# Patient Record
Sex: FEMALE | Race: WHITE | NOT HISPANIC OR LATINO | URBAN - METROPOLITAN AREA
[De-identification: names, ages, dates, MRNs, and addresses within clinical notes are randomized per-mention and may not be internally consistent; named-entity substitution may affect disease eponyms.]

---

## 2017-01-21 ENCOUNTER — OUTPATIENT (OUTPATIENT)
Dept: OUTPATIENT SERVICES | Facility: HOSPITAL | Age: 32
LOS: 1 days | End: 2017-01-21
Payer: MEDICAID

## 2017-01-21 DIAGNOSIS — Z98.89 OTHER SPECIFIED POSTPROCEDURAL STATES: Chronic | ICD-10-CM

## 2017-01-21 DIAGNOSIS — O26.90 PREGNANCY RELATED CONDITIONS, UNSPECIFIED, UNSPECIFIED TRIMESTER: ICD-10-CM

## 2017-01-21 PROCEDURE — 99213 OFFICE O/P EST LOW 20 MIN: CPT | Mod: 25

## 2017-01-21 PROCEDURE — 76815 OB US LIMITED FETUS(S): CPT | Mod: 26

## 2017-01-23 ENCOUNTER — OUTPATIENT (OUTPATIENT)
Dept: OUTPATIENT SERVICES | Facility: HOSPITAL | Age: 32
LOS: 1 days | End: 2017-01-23

## 2017-01-23 DIAGNOSIS — O26.90 PREGNANCY RELATED CONDITIONS, UNSPECIFIED, UNSPECIFIED TRIMESTER: ICD-10-CM

## 2017-01-23 DIAGNOSIS — Z98.89 OTHER SPECIFIED POSTPROCEDURAL STATES: Chronic | ICD-10-CM

## 2017-01-25 ENCOUNTER — INPATIENT (INPATIENT)
Facility: HOSPITAL | Age: 32
LOS: 1 days | Discharge: HOME | End: 2017-01-27
Attending: SPECIALIST | Admitting: SPECIALIST

## 2017-01-25 VITALS — WEIGHT: 169.76 LBS | HEIGHT: 64 IN

## 2017-01-25 DIAGNOSIS — O48.0 POST-TERM PREGNANCY: ICD-10-CM

## 2017-01-25 DIAGNOSIS — Z3A.00 WEEKS OF GESTATION OF PREGNANCY NOT SPECIFIED: ICD-10-CM

## 2017-01-25 DIAGNOSIS — O26.899 OTHER SPECIFIED PREGNANCY RELATED CONDITIONS, UNSPECIFIED TRIMESTER: ICD-10-CM

## 2017-01-25 DIAGNOSIS — Z98.89 OTHER SPECIFIED POSTPROCEDURAL STATES: Chronic | ICD-10-CM

## 2017-01-25 LAB
BASOPHILS # BLD AUTO: 0.03 K/UL — SIGNIFICANT CHANGE UP (ref 0–0.2)
BASOPHILS NFR BLD AUTO: 0.2 % — SIGNIFICANT CHANGE UP (ref 0–2)
BLD GP AB SCN SERPL QL: NEGATIVE — SIGNIFICANT CHANGE UP
EOSINOPHIL # BLD AUTO: 0.09 K/UL — SIGNIFICANT CHANGE UP (ref 0–0.5)
EOSINOPHIL NFR BLD AUTO: 0.7 % — SIGNIFICANT CHANGE UP (ref 0–6)
HCT VFR BLD CALC: 38.1 % — SIGNIFICANT CHANGE UP (ref 34.5–45)
HGB BLD-MCNC: 13.1 G/DL — SIGNIFICANT CHANGE UP (ref 11.5–15.5)
IMM GRANULOCYTES NFR BLD AUTO: 0.7 % — SIGNIFICANT CHANGE UP (ref 0–1.5)
LYMPHOCYTES # BLD AUTO: 18.3 % — SIGNIFICANT CHANGE UP (ref 13–44)
LYMPHOCYTES # BLD AUTO: 2.47 K/UL — SIGNIFICANT CHANGE UP (ref 1–3.3)
MCHC RBC-ENTMCNC: 30.2 PG — SIGNIFICANT CHANGE UP (ref 27–34)
MCHC RBC-ENTMCNC: 34.4 % — SIGNIFICANT CHANGE UP (ref 32–36)
MCV RBC AUTO: 87.8 FL — SIGNIFICANT CHANGE UP (ref 80–100)
MONOCYTES # BLD AUTO: 1.27 K/UL — HIGH (ref 0–0.9)
MONOCYTES NFR BLD AUTO: 9.4 % — SIGNIFICANT CHANGE UP (ref 2–14)
NEUTROPHILS # BLD AUTO: 9.55 K/UL — HIGH (ref 1.8–7.4)
NEUTROPHILS NFR BLD AUTO: 70.7 % — SIGNIFICANT CHANGE UP (ref 43–77)
PLATELET # BLD AUTO: 211 K/UL — SIGNIFICANT CHANGE UP (ref 150–400)
PMV BLD: 12 FL — SIGNIFICANT CHANGE UP (ref 7–13)
RBC # BLD: 4.34 M/UL — SIGNIFICANT CHANGE UP (ref 3.8–5.2)
RBC # FLD: 13.7 % — SIGNIFICANT CHANGE UP (ref 10.3–14.5)
RH IG SCN BLD-IMP: POSITIVE — SIGNIFICANT CHANGE UP
RH IG SCN BLD-IMP: POSITIVE — SIGNIFICANT CHANGE UP
T PALLIDUM AB TITR SER: NEGATIVE — SIGNIFICANT CHANGE UP
WBC # BLD: 13.5 K/UL — HIGH (ref 3.8–10.5)
WBC # FLD AUTO: 13.5 K/UL — HIGH (ref 3.8–10.5)

## 2017-01-25 RX ORDER — PENICILLIN G POTASSIUM 5000000 [IU]/1
5 POWDER, FOR SOLUTION INTRAMUSCULAR; INTRAPLEURAL; INTRATHECAL; INTRAVENOUS ONCE
Qty: 0 | Refills: 0 | Status: COMPLETED | OUTPATIENT
Start: 2017-01-25 | End: 2017-01-25

## 2017-01-25 RX ORDER — SIMETHICONE 80 MG/1
80 TABLET, CHEWABLE ORAL EVERY 6 HOURS
Qty: 0 | Refills: 0 | Status: DISCONTINUED | OUTPATIENT
Start: 2017-01-26 | End: 2017-01-27

## 2017-01-25 RX ORDER — TETANUS TOXOID, REDUCED DIPHTHERIA TOXOID AND ACELLULAR PERTUSSIS VACCINE, ADSORBED 5; 2.5; 8; 8; 2.5 [IU]/.5ML; [IU]/.5ML; UG/.5ML; UG/.5ML; UG/.5ML
0.5 SUSPENSION INTRAMUSCULAR ONCE
Qty: 0 | Refills: 0 | Status: DISCONTINUED | OUTPATIENT
Start: 2017-01-26 | End: 2017-01-27

## 2017-01-25 RX ORDER — OXYTOCIN 10 UNIT/ML
41.67 VIAL (ML) INJECTION
Qty: 20 | Refills: 0 | Status: DISCONTINUED | OUTPATIENT
Start: 2017-01-25 | End: 2017-01-26

## 2017-01-25 RX ORDER — ACETAMINOPHEN 500 MG
975 TABLET ORAL EVERY 6 HOURS
Qty: 0 | Refills: 0 | Status: DISCONTINUED | OUTPATIENT
Start: 2017-01-26 | End: 2017-01-27

## 2017-01-25 RX ORDER — PRAMOXINE HYDROCHLORIDE 150 MG/15G
1 AEROSOL, FOAM RECTAL EVERY 4 HOURS
Qty: 0 | Refills: 0 | Status: DISCONTINUED | OUTPATIENT
Start: 2017-01-25 | End: 2017-01-26

## 2017-01-25 RX ORDER — HYDROCORTISONE 1 %
1 OINTMENT (GRAM) TOPICAL EVERY 4 HOURS
Qty: 0 | Refills: 0 | Status: DISCONTINUED | OUTPATIENT
Start: 2017-01-26 | End: 2017-01-27

## 2017-01-25 RX ORDER — AER TRAVELER 0.5 G/1
1 SOLUTION RECTAL; TOPICAL EVERY 4 HOURS
Qty: 0 | Refills: 0 | Status: DISCONTINUED | OUTPATIENT
Start: 2017-01-26 | End: 2017-01-27

## 2017-01-25 RX ORDER — MAGNESIUM HYDROXIDE 400 MG/1
30 TABLET, CHEWABLE ORAL
Qty: 0 | Refills: 0 | Status: DISCONTINUED | OUTPATIENT
Start: 2017-01-26 | End: 2017-01-27

## 2017-01-25 RX ORDER — DOCUSATE SODIUM 100 MG
100 CAPSULE ORAL
Qty: 0 | Refills: 0 | Status: DISCONTINUED | OUTPATIENT
Start: 2017-01-26 | End: 2017-01-27

## 2017-01-25 RX ORDER — OXYCODONE HYDROCHLORIDE 5 MG/1
5 TABLET ORAL
Qty: 0 | Refills: 0 | Status: DISCONTINUED | OUTPATIENT
Start: 2017-01-26 | End: 2017-01-27

## 2017-01-25 RX ORDER — PENICILLIN G POTASSIUM 5000000 [IU]/1
POWDER, FOR SOLUTION INTRAMUSCULAR; INTRAPLEURAL; INTRATHECAL; INTRAVENOUS
Qty: 0 | Refills: 0 | Status: DISCONTINUED | OUTPATIENT
Start: 2017-01-25 | End: 2017-01-25

## 2017-01-25 RX ORDER — SODIUM CHLORIDE 9 MG/ML
1000 INJECTION, SOLUTION INTRAVENOUS
Qty: 0 | Refills: 0 | Status: DISCONTINUED | OUTPATIENT
Start: 2017-01-25 | End: 2017-01-25

## 2017-01-25 RX ORDER — OXYCODONE HYDROCHLORIDE 5 MG/1
5 TABLET ORAL EVERY 4 HOURS
Qty: 0 | Refills: 0 | Status: DISCONTINUED | OUTPATIENT
Start: 2017-01-26 | End: 2017-01-27

## 2017-01-25 RX ORDER — IBUPROFEN 200 MG
600 TABLET ORAL EVERY 6 HOURS
Qty: 0 | Refills: 0 | Status: DISCONTINUED | OUTPATIENT
Start: 2017-01-26 | End: 2017-01-27

## 2017-01-25 RX ORDER — AER TRAVELER 0.5 G/1
1 SOLUTION RECTAL; TOPICAL EVERY 4 HOURS
Qty: 0 | Refills: 0 | Status: DISCONTINUED | OUTPATIENT
Start: 2017-01-25 | End: 2017-01-26

## 2017-01-25 RX ORDER — DIBUCAINE 1 %
1 OINTMENT (GRAM) RECTAL EVERY 4 HOURS
Qty: 0 | Refills: 0 | Status: DISCONTINUED | OUTPATIENT
Start: 2017-01-25 | End: 2017-01-26

## 2017-01-25 RX ORDER — OXYTOCIN 10 UNIT/ML
333.33 VIAL (ML) INJECTION
Qty: 20 | Refills: 0 | Status: DISCONTINUED | OUTPATIENT
Start: 2017-01-25 | End: 2017-01-25

## 2017-01-25 RX ORDER — CITRIC ACID/SODIUM CITRATE 300-500 MG
15 SOLUTION, ORAL ORAL EVERY 4 HOURS
Qty: 0 | Refills: 0 | Status: DISCONTINUED | OUTPATIENT
Start: 2017-01-25 | End: 2017-01-25

## 2017-01-25 RX ORDER — LANOLIN
1 OINTMENT (GRAM) TOPICAL EVERY 6 HOURS
Qty: 0 | Refills: 0 | Status: DISCONTINUED | OUTPATIENT
Start: 2017-01-26 | End: 2017-01-27

## 2017-01-25 RX ORDER — DIPHENHYDRAMINE HCL 50 MG
25 CAPSULE ORAL EVERY 6 HOURS
Qty: 0 | Refills: 0 | Status: DISCONTINUED | OUTPATIENT
Start: 2017-01-26 | End: 2017-01-27

## 2017-01-25 RX ORDER — DIBUCAINE 1 %
1 OINTMENT (GRAM) RECTAL EVERY 4 HOURS
Qty: 0 | Refills: 0 | Status: DISCONTINUED | OUTPATIENT
Start: 2017-01-26 | End: 2017-01-27

## 2017-01-25 RX ORDER — PRAMOXINE HYDROCHLORIDE 150 MG/15G
1 AEROSOL, FOAM RECTAL EVERY 4 HOURS
Qty: 0 | Refills: 0 | Status: DISCONTINUED | OUTPATIENT
Start: 2017-01-26 | End: 2017-01-27

## 2017-01-25 RX ORDER — KETOROLAC TROMETHAMINE 30 MG/ML
30 SYRINGE (ML) INJECTION ONCE
Qty: 0 | Refills: 0 | Status: DISCONTINUED | OUTPATIENT
Start: 2017-01-25 | End: 2017-01-25

## 2017-01-25 RX ORDER — GLYCERIN ADULT
1 SUPPOSITORY, RECTAL RECTAL AT BEDTIME
Qty: 0 | Refills: 0 | Status: DISCONTINUED | OUTPATIENT
Start: 2017-01-26 | End: 2017-01-27

## 2017-01-25 RX ORDER — SODIUM CHLORIDE 9 MG/ML
3 INJECTION INTRAMUSCULAR; INTRAVENOUS; SUBCUTANEOUS EVERY 8 HOURS
Qty: 0 | Refills: 0 | Status: DISCONTINUED | OUTPATIENT
Start: 2017-01-25 | End: 2017-01-26

## 2017-01-25 RX ORDER — SODIUM CHLORIDE 9 MG/ML
3 INJECTION INTRAMUSCULAR; INTRAVENOUS; SUBCUTANEOUS EVERY 8 HOURS
Qty: 0 | Refills: 0 | Status: DISCONTINUED | OUTPATIENT
Start: 2017-01-26 | End: 2017-01-27

## 2017-01-25 RX ORDER — HYDROCORTISONE 1 %
1 OINTMENT (GRAM) TOPICAL EVERY 4 HOURS
Qty: 0 | Refills: 0 | Status: DISCONTINUED | OUTPATIENT
Start: 2017-01-25 | End: 2017-01-26

## 2017-01-25 RX ORDER — PENICILLIN G POTASSIUM 5000000 [IU]/1
2.5 POWDER, FOR SOLUTION INTRAMUSCULAR; INTRAPLEURAL; INTRATHECAL; INTRAVENOUS EVERY 4 HOURS
Qty: 0 | Refills: 0 | Status: DISCONTINUED | OUTPATIENT
Start: 2017-01-25 | End: 2017-01-25

## 2017-01-25 RX ORDER — SODIUM CHLORIDE 9 MG/ML
1000 INJECTION, SOLUTION INTRAVENOUS ONCE
Qty: 0 | Refills: 0 | Status: DISCONTINUED | OUTPATIENT
Start: 2017-01-25 | End: 2017-01-25

## 2017-01-25 RX ADMIN — SODIUM CHLORIDE 3 MILLILITER(S): 9 INJECTION INTRAMUSCULAR; INTRAVENOUS; SUBCUTANEOUS at 22:51

## 2017-01-25 RX ADMIN — Medication 30 MILLIGRAM(S): at 22:49

## 2017-01-25 RX ADMIN — PENICILLIN G POTASSIUM 200 MILLION UNIT(S): 5000000 POWDER, FOR SOLUTION INTRAMUSCULAR; INTRAPLEURAL; INTRATHECAL; INTRAVENOUS at 16:33

## 2017-01-25 RX ADMIN — SODIUM CHLORIDE 125 MILLILITER(S): 9 INJECTION, SOLUTION INTRAVENOUS at 16:33

## 2017-01-25 RX ADMIN — Medication 125 MILLIUNIT(S)/MIN: at 22:50

## 2017-01-25 NOTE — DISCHARGE NOTE OB - CARE PROVIDER_API CALL
Jasmyne Suarez (MD), Obstetrics and Gynecology  [address] [address] [place] [id number]  Phone: [phone]  Fax: [phone]

## 2017-01-25 NOTE — DISCHARGE NOTE OB - MEDICATION SUMMARY - MEDICATIONS TO STOP TAKING
I will STOP taking the medications listed below when I get home from the hospital:    ibuprofen 600 mg oral tablet  -- 1 tab(s) by mouth every 6 hours

## 2017-01-25 NOTE — DISCHARGE NOTE OB - CARE PLAN
Principal Discharge DX:	Delivery normal  Goal:	home with self care  Instructions for follow-up, activity and diet:	home with self care

## 2017-01-25 NOTE — DISCHARGE NOTE OB - MATERIALS PROVIDED
Letter of Medical Neccessity/[place]  Immunization Record/[facility] Hearing Screen Program/Breastfeeding Log/Vaccinations/Birth Certificate Instructions/Guide to Postpartum Care

## 2017-01-25 NOTE — DISCHARGE NOTE OB - PATIENT PORTAL LINK FT
“You can access the FollowHealth Patient Portal, offered by [facility], by registering with the following website: [email or web address]”

## 2017-01-25 NOTE — DISCHARGE NOTE OB - MEDICATION SUMMARY - MEDICATIONS TO TAKE
I will START or STAY ON the medications listed below when I get home from the hospital:    ibuprofen 600 mg oral tablet  -- 1 tab(s) by mouth every 6 hours  -- Indication: For Pain    Prenatal Multivitamins with Folic Acid 1 mg oral capsule  -- 1 cap(s) by mouth once a day  -- Indication: For nutrition I will START or STAY ON the medications listed below when I get home from the hospital:    ibuprofen 600 mg oral tablet  -- 1 tab(s) by mouth every 6 hours, As Needed  -- Indication: For Delivery normal    Prenatal Multivitamins with Folic Acid 1 mg oral capsule  -- 1 cap(s) by mouth once a day  -- Indication: For Delivery normal

## 2017-01-26 RX ORDER — OXYTOCIN 10 UNIT/ML
41.67 VIAL (ML) INJECTION
Qty: 20 | Refills: 0 | Status: DISCONTINUED | OUTPATIENT
Start: 2017-01-26 | End: 2017-01-26

## 2017-01-26 RX ADMIN — Medication 975 MILLIGRAM(S): at 15:08

## 2017-01-26 RX ADMIN — Medication 600 MILLIGRAM(S): at 13:45

## 2017-01-26 RX ADMIN — OXYCODONE HYDROCHLORIDE 5 MILLIGRAM(S): 5 TABLET ORAL at 11:15

## 2017-01-26 RX ADMIN — Medication 975 MILLIGRAM(S): at 09:43

## 2017-01-26 RX ADMIN — OXYCODONE HYDROCHLORIDE 5 MILLIGRAM(S): 5 TABLET ORAL at 01:42

## 2017-01-26 RX ADMIN — Medication 600 MILLIGRAM(S): at 18:02

## 2017-01-26 RX ADMIN — OXYCODONE HYDROCHLORIDE 5 MILLIGRAM(S): 5 TABLET ORAL at 10:41

## 2017-01-26 RX ADMIN — OXYCODONE HYDROCHLORIDE 5 MILLIGRAM(S): 5 TABLET ORAL at 05:26

## 2017-01-26 RX ADMIN — Medication 975 MILLIGRAM(S): at 10:15

## 2017-01-26 RX ADMIN — OXYCODONE HYDROCHLORIDE 5 MILLIGRAM(S): 5 TABLET ORAL at 18:41

## 2017-01-26 RX ADMIN — OXYCODONE HYDROCHLORIDE 5 MILLIGRAM(S): 5 TABLET ORAL at 06:26

## 2017-01-26 RX ADMIN — Medication 600 MILLIGRAM(S): at 06:26

## 2017-01-26 RX ADMIN — Medication 600 MILLIGRAM(S): at 18:40

## 2017-01-26 RX ADMIN — SODIUM CHLORIDE 3 MILLILITER(S): 9 INJECTION INTRAMUSCULAR; INTRAVENOUS; SUBCUTANEOUS at 06:55

## 2017-01-26 RX ADMIN — Medication 975 MILLIGRAM(S): at 01:42

## 2017-01-26 RX ADMIN — Medication 600 MILLIGRAM(S): at 13:07

## 2017-01-26 RX ADMIN — OXYCODONE HYDROCHLORIDE 5 MILLIGRAM(S): 5 TABLET ORAL at 20:48

## 2017-01-26 RX ADMIN — OXYCODONE HYDROCHLORIDE 5 MILLIGRAM(S): 5 TABLET ORAL at 00:42

## 2017-01-26 RX ADMIN — Medication 975 MILLIGRAM(S): at 00:42

## 2017-01-26 RX ADMIN — Medication 600 MILLIGRAM(S): at 05:26

## 2017-01-26 RX ADMIN — OXYCODONE HYDROCHLORIDE 5 MILLIGRAM(S): 5 TABLET ORAL at 15:50

## 2017-01-26 RX ADMIN — OXYCODONE HYDROCHLORIDE 5 MILLIGRAM(S): 5 TABLET ORAL at 21:18

## 2017-01-26 RX ADMIN — Medication 975 MILLIGRAM(S): at 15:50

## 2017-01-26 RX ADMIN — OXYCODONE HYDROCHLORIDE 5 MILLIGRAM(S): 5 TABLET ORAL at 18:05

## 2017-01-26 RX ADMIN — OXYCODONE HYDROCHLORIDE 5 MILLIGRAM(S): 5 TABLET ORAL at 15:08

## 2017-01-27 VITALS
SYSTOLIC BLOOD PRESSURE: 108 MMHG | TEMPERATURE: 98 F | OXYGEN SATURATION: 98 % | HEART RATE: 61 BPM | DIASTOLIC BLOOD PRESSURE: 67 MMHG | RESPIRATION RATE: 18 BRPM

## 2017-01-27 RX ORDER — IBUPROFEN 200 MG
1 TABLET ORAL
Qty: 0 | Refills: 0 | COMMUNITY

## 2017-01-27 RX ADMIN — OXYCODONE HYDROCHLORIDE 5 MILLIGRAM(S): 5 TABLET ORAL at 00:42

## 2017-01-27 RX ADMIN — OXYCODONE HYDROCHLORIDE 5 MILLIGRAM(S): 5 TABLET ORAL at 05:13

## 2017-01-27 RX ADMIN — Medication 975 MILLIGRAM(S): at 05:13

## 2017-01-27 RX ADMIN — Medication 600 MILLIGRAM(S): at 01:14

## 2017-01-27 RX ADMIN — Medication 600 MILLIGRAM(S): at 07:26

## 2017-01-27 RX ADMIN — Medication 975 MILLIGRAM(S): at 04:43

## 2017-01-27 RX ADMIN — Medication 600 MILLIGRAM(S): at 06:56

## 2017-01-27 RX ADMIN — OXYCODONE HYDROCHLORIDE 5 MILLIGRAM(S): 5 TABLET ORAL at 04:43

## 2017-01-27 RX ADMIN — Medication 600 MILLIGRAM(S): at 00:44

## 2017-01-27 RX ADMIN — OXYCODONE HYDROCHLORIDE 5 MILLIGRAM(S): 5 TABLET ORAL at 01:14

## 2017-06-07 ENCOUNTER — RESULT REVIEW (OUTPATIENT)
Age: 32
End: 2017-06-07

## 2018-10-22 ENCOUNTER — RESULT REVIEW (OUTPATIENT)
Age: 33
End: 2018-10-22

## 2019-01-30 ENCOUNTER — ASOB RESULT (OUTPATIENT)
Age: 34
End: 2019-01-30

## 2019-01-30 ENCOUNTER — APPOINTMENT (OUTPATIENT)
Dept: ANTEPARTUM | Facility: CLINIC | Age: 34
End: 2019-01-30
Payer: MEDICAID

## 2019-01-30 PROCEDURE — 76811 OB US DETAILED SNGL FETUS: CPT

## 2019-04-30 ENCOUNTER — OUTPATIENT (OUTPATIENT)
Dept: INPATIENT UNIT | Facility: HOSPITAL | Age: 34
LOS: 1 days | Discharge: HOME | End: 2019-04-30

## 2019-04-30 VITALS — DIASTOLIC BLOOD PRESSURE: 59 MMHG | SYSTOLIC BLOOD PRESSURE: 104 MMHG | HEART RATE: 94 BPM

## 2019-04-30 VITALS
DIASTOLIC BLOOD PRESSURE: 65 MMHG | OXYGEN SATURATION: 100 % | TEMPERATURE: 99 F | HEART RATE: 103 BPM | RESPIRATION RATE: 16 BRPM | SYSTOLIC BLOOD PRESSURE: 116 MMHG

## 2019-04-30 DIAGNOSIS — O26.899 OTHER SPECIFIED PREGNANCY RELATED CONDITIONS, UNSPECIFIED TRIMESTER: ICD-10-CM

## 2019-04-30 DIAGNOSIS — Z3A.00 WEEKS OF GESTATION OF PREGNANCY NOT SPECIFIED: ICD-10-CM

## 2019-04-30 DIAGNOSIS — Z98.89 OTHER SPECIFIED POSTPROCEDURAL STATES: Chronic | ICD-10-CM

## 2019-04-30 LAB
ALBUMIN SERPL ELPH-MCNC: 3.1 G/DL — LOW (ref 3.3–5)
ALP SERPL-CCNC: 105 U/L — SIGNIFICANT CHANGE UP (ref 40–120)
ALT FLD-CCNC: 15 U/L — SIGNIFICANT CHANGE UP (ref 4–33)
ANION GAP SERPL CALC-SCNC: 13 MMO/L — SIGNIFICANT CHANGE UP (ref 7–14)
APPEARANCE UR: CLEAR — SIGNIFICANT CHANGE UP
APTT BLD: 26.3 SEC — LOW (ref 27.5–36.3)
AST SERPL-CCNC: 13 U/L — SIGNIFICANT CHANGE UP (ref 4–32)
BASOPHILS # BLD AUTO: 0.06 K/UL — SIGNIFICANT CHANGE UP (ref 0–0.2)
BASOPHILS NFR BLD AUTO: 0.6 % — SIGNIFICANT CHANGE UP (ref 0–2)
BILIRUB SERPL-MCNC: < 0.2 MG/DL — LOW (ref 0.2–1.2)
BILIRUB UR-MCNC: NEGATIVE — SIGNIFICANT CHANGE UP
BLOOD UR QL VISUAL: NEGATIVE — SIGNIFICANT CHANGE UP
BUN SERPL-MCNC: 9 MG/DL — SIGNIFICANT CHANGE UP (ref 7–23)
CALCIUM SERPL-MCNC: 8.9 MG/DL — SIGNIFICANT CHANGE UP (ref 8.4–10.5)
CHLORIDE SERPL-SCNC: 104 MMOL/L — SIGNIFICANT CHANGE UP (ref 98–107)
CO2 SERPL-SCNC: 21 MMOL/L — LOW (ref 22–31)
COLOR SPEC: COLORLESS — SIGNIFICANT CHANGE UP
CREAT ?TM UR-MCNC: 28.3 MG/DL — SIGNIFICANT CHANGE UP
CREAT SERPL-MCNC: 0.45 MG/DL — LOW (ref 0.5–1.3)
EOSINOPHIL # BLD AUTO: 0.18 K/UL — SIGNIFICANT CHANGE UP (ref 0–0.5)
EOSINOPHIL NFR BLD AUTO: 1.7 % — SIGNIFICANT CHANGE UP (ref 0–6)
FIBRINOGEN PPP-MCNC: 677.2 MG/DL — HIGH (ref 350–510)
GLUCOSE SERPL-MCNC: 84 MG/DL — SIGNIFICANT CHANGE UP (ref 70–99)
GLUCOSE UR-MCNC: NEGATIVE — SIGNIFICANT CHANGE UP
HCT VFR BLD CALC: 35 % — SIGNIFICANT CHANGE UP (ref 34.5–45)
HGB BLD-MCNC: 11.9 G/DL — SIGNIFICANT CHANGE UP (ref 11.5–15.5)
IMM GRANULOCYTES NFR BLD AUTO: 1.3 % — SIGNIFICANT CHANGE UP (ref 0–1.5)
INR BLD: 0.97 — SIGNIFICANT CHANGE UP (ref 0.88–1.17)
KETONES UR-MCNC: NEGATIVE — SIGNIFICANT CHANGE UP
LDH SERPL L TO P-CCNC: 171 U/L — SIGNIFICANT CHANGE UP (ref 135–225)
LEUKOCYTE ESTERASE UR-ACNC: NEGATIVE — SIGNIFICANT CHANGE UP
LYMPHOCYTES # BLD AUTO: 2.29 K/UL — SIGNIFICANT CHANGE UP (ref 1–3.3)
LYMPHOCYTES # BLD AUTO: 21.5 % — SIGNIFICANT CHANGE UP (ref 13–44)
MCHC RBC-ENTMCNC: 29.8 PG — SIGNIFICANT CHANGE UP (ref 27–34)
MCHC RBC-ENTMCNC: 34 % — SIGNIFICANT CHANGE UP (ref 32–36)
MCV RBC AUTO: 87.5 FL — SIGNIFICANT CHANGE UP (ref 80–100)
MONOCYTES # BLD AUTO: 1.14 K/UL — HIGH (ref 0–0.9)
MONOCYTES NFR BLD AUTO: 10.7 % — SIGNIFICANT CHANGE UP (ref 2–14)
NEUTROPHILS # BLD AUTO: 6.84 K/UL — SIGNIFICANT CHANGE UP (ref 1.8–7.4)
NEUTROPHILS NFR BLD AUTO: 64.2 % — SIGNIFICANT CHANGE UP (ref 43–77)
NITRITE UR-MCNC: NEGATIVE — SIGNIFICANT CHANGE UP
NRBC # FLD: 0 K/UL — SIGNIFICANT CHANGE UP (ref 0–0)
PH UR: 8 — SIGNIFICANT CHANGE UP (ref 5–8)
PLATELET # BLD AUTO: 167 K/UL — SIGNIFICANT CHANGE UP (ref 150–400)
PMV BLD: 11.4 FL — SIGNIFICANT CHANGE UP (ref 7–13)
POTASSIUM SERPL-MCNC: 3.6 MMOL/L — SIGNIFICANT CHANGE UP (ref 3.5–5.3)
POTASSIUM SERPL-SCNC: 3.6 MMOL/L — SIGNIFICANT CHANGE UP (ref 3.5–5.3)
PROT SERPL-MCNC: 6.1 G/DL — SIGNIFICANT CHANGE UP (ref 6–8.3)
PROT UR-MCNC: 5.7 MG/DL — SIGNIFICANT CHANGE UP
PROT UR-MCNC: NEGATIVE — SIGNIFICANT CHANGE UP
PROTHROM AB SERPL-ACNC: 10.7 SEC — SIGNIFICANT CHANGE UP (ref 9.8–13.1)
RBC # BLD: 4 M/UL — SIGNIFICANT CHANGE UP (ref 3.8–5.2)
RBC # FLD: 13.7 % — SIGNIFICANT CHANGE UP (ref 10.3–14.5)
SODIUM SERPL-SCNC: 138 MMOL/L — SIGNIFICANT CHANGE UP (ref 135–145)
SP GR SPEC: 1.01 — SIGNIFICANT CHANGE UP (ref 1–1.04)
URATE SERPL-MCNC: 3 MG/DL — SIGNIFICANT CHANGE UP (ref 2.5–7)
UROBILINOGEN FLD QL: NORMAL — SIGNIFICANT CHANGE UP
WBC # BLD: 10.65 K/UL — HIGH (ref 3.8–10.5)
WBC # FLD AUTO: 10.65 K/UL — HIGH (ref 3.8–10.5)

## 2019-04-30 RX ORDER — IBUPROFEN 200 MG
1 TABLET ORAL
Qty: 0 | Refills: 0 | COMMUNITY

## 2019-04-30 NOTE — OB PROVIDER TRIAGE NOTE - NSOBPROVIDERNOTE_OBGYN_ALL_OB_FT
Patient is a 33y/o  @ 33 1/7wks gestation who reports to triage, from the office, to r/o PEC.  SB/P in the office were 140 - 160,  DB/P 90'S  Denies h/a, n/v, visual disturbances or ruq/epigastric pain.  Denies lof/vb/ctx.  Reports good fetal movements.    Denies AP Complications  Meds - pnv  NKDA    OB   @ 37wks IOL 2' Oligo - 2007 - 4lbs    - 9/15/2008 - 6lbds 13oz   - 2010 - 6lbs 7oz   - 2014 - 6lbs 13oz   - 2017 - 6lbs 10oz  Incomp ab x2 with D&C X 2  Comp SAB x1     B/P range -   Adbdomen gravid, soft and nontender  NST -  Cephalic presentation  PEC Labs sent. Patient is a 33y/o  @ 33 1/7wks gestation who reports to triage, from the office, to r/o PEC.  SB/P in the office were 140 - 160,  DB/P 90'S  Denies h/a, n/v, visual disturbances or ruq/epigastric pain.  Denies lof/vb/ctx.  Reports good fetal movements.    Denies AP Complications  Meds - pnv  NKDA    OB   @ 37wks IOL 2' Oligo - 2007 - 4lbs    - 9/15/2008 - 6lbds 13oz   - 2010 - 6lbs 7oz   - 2014 - 6lbs 13oz   - 2017 - 6lbs 10oz  Incomp ab x2 with D&C X 2  Comp SAB x1     B/P range - /50-68  Adbdomen gravid, soft and nontender  NST - cat 1 fht  Cephalic presentation  PEC Labs - wnl    Discussed patient with Dr Schwarz.  Patient is cleared for discharge home.  To f/u with Dr Nava as scheduled

## 2019-04-30 NOTE — OB PROVIDER TRIAGE NOTE - NSHPLABSRESULTS_GEN_ALL_CORE
pec labs pec labs                          11.9   10.65 )-----------( 167      ( 2019 20:50 )             35.0         138  |  104  |  9   ----------------------------<  84  3.6   |  21<L>  |  0.45<L>    Ca    8.9      2019 20:50    TPro  6.1  /  Alb  3.1<L>  /  TBili  < 0.2<L>  /  DBili  x   /  AST  13  /  ALT  15  /  AlkPhos  105            Urinalysis Basic - ( 2019 20:50 )    Color: COLORLESS / Appearance: CLEAR / S.010 / pH: 8.0  Gluc: NEGATIVE / Ketone: NEGATIVE  / Bili: NEGATIVE / Urobili: NORMAL   Blood: NEGATIVE / Protein: NEGATIVE / Nitrite: NEGATIVE   Leuk Esterase: NEGATIVE / RBC: x / WBC x   Sq Epi: x / Non Sq Epi: x / Bacteria: x      PT/INR - ( 2019 20:50 )   PT: 10.7 SEC;   INR: 0.97          PTT - ( 2019 20:50 )  PTT:26.3 SEC    CAPILLARY BLOOD GLUCOSE        Urine culture     Rapid flu   creatinine    protein Protein, Random Urine: 5.7 mg/dL (19 @ 20:50)    P/c ratio     Fibrinogen Assay Fibrinogen Assay: 677.2 mg/dL (19 @ 20:50)

## 2019-04-30 NOTE — OB PROVIDER TRIAGE NOTE - NSHPPHYSICALEXAM_GEN_ALL_CORE
Adbdomen gravid, soft and nontender  NST -  Cephalic presentation Adbdomen gravid, soft and nontender  NST - cat 1 fht  Cephalic presentation

## 2019-04-30 NOTE — OB PROVIDER TRIAGE NOTE - NS_OBGYNHISTORY_OBGYN_ALL_OB_FT
@ 37wks IOL 2' Oligo - 2007 - 4lbs    - 9/15/2008 - 6lbds 13oz   - 2010 - 6lbs 7oz   - 2014 - 6lbs 13oz   - 2017 - 6lbs 10oz  Incomp ab x2 with D&C X 2  Comp SAB x1

## 2019-04-30 NOTE — OB PROVIDER TRIAGE NOTE - HISTORY OF PRESENT ILLNESS
Patient is a 33y/o  @ 33 1/7wks gestation who reports to triage, from the office, to r/o PEC.  SB/P in the office were 140 - 160,  DB/P 90'S  Denies h/a, n/v, visual disturbances or ruq/epigastric pain.  Denies lof/vb/ctx.  Reports good fetal movements.

## 2019-06-23 ENCOUNTER — INPATIENT (INPATIENT)
Facility: HOSPITAL | Age: 34
LOS: 1 days | Discharge: HOME | End: 2019-06-25
Attending: SPECIALIST | Admitting: SPECIALIST

## 2019-06-23 ENCOUNTER — TRANSCRIPTION ENCOUNTER (OUTPATIENT)
Age: 34
End: 2019-06-23

## 2019-06-23 VITALS
SYSTOLIC BLOOD PRESSURE: 127 MMHG | TEMPERATURE: 99 F | DIASTOLIC BLOOD PRESSURE: 78 MMHG | RESPIRATION RATE: 16 BRPM | HEART RATE: 88 BPM

## 2019-06-23 DIAGNOSIS — O26.899 OTHER SPECIFIED PREGNANCY RELATED CONDITIONS, UNSPECIFIED TRIMESTER: ICD-10-CM

## 2019-06-23 DIAGNOSIS — Z98.89 OTHER SPECIFIED POSTPROCEDURAL STATES: Chronic | ICD-10-CM

## 2019-06-23 DIAGNOSIS — Z3A.00 WEEKS OF GESTATION OF PREGNANCY NOT SPECIFIED: ICD-10-CM

## 2019-06-23 LAB
BASOPHILS # BLD AUTO: 0.05 K/UL — SIGNIFICANT CHANGE UP (ref 0–0.2)
BASOPHILS NFR BLD AUTO: 0.4 % — SIGNIFICANT CHANGE UP (ref 0–2)
BLD GP AB SCN SERPL QL: NEGATIVE — SIGNIFICANT CHANGE UP
EOSINOPHIL # BLD AUTO: 0.06 K/UL — SIGNIFICANT CHANGE UP (ref 0–0.5)
EOSINOPHIL NFR BLD AUTO: 0.5 % — SIGNIFICANT CHANGE UP (ref 0–6)
HCT VFR BLD CALC: 38.4 % — SIGNIFICANT CHANGE UP (ref 34.5–45)
HGB BLD-MCNC: 13.1 G/DL — SIGNIFICANT CHANGE UP (ref 11.5–15.5)
IMM GRANULOCYTES NFR BLD AUTO: 0.7 % — SIGNIFICANT CHANGE UP (ref 0–1.5)
LYMPHOCYTES # BLD AUTO: 18.3 % — SIGNIFICANT CHANGE UP (ref 13–44)
LYMPHOCYTES # BLD AUTO: 2.19 K/UL — SIGNIFICANT CHANGE UP (ref 1–3.3)
MCHC RBC-ENTMCNC: 30.3 PG — SIGNIFICANT CHANGE UP (ref 27–34)
MCHC RBC-ENTMCNC: 34.1 % — SIGNIFICANT CHANGE UP (ref 32–36)
MCV RBC AUTO: 88.7 FL — SIGNIFICANT CHANGE UP (ref 80–100)
MONOCYTES # BLD AUTO: 0.87 K/UL — SIGNIFICANT CHANGE UP (ref 0–0.9)
MONOCYTES NFR BLD AUTO: 7.3 % — SIGNIFICANT CHANGE UP (ref 2–14)
NEUTROPHILS # BLD AUTO: 8.75 K/UL — HIGH (ref 1.8–7.4)
NEUTROPHILS NFR BLD AUTO: 72.8 % — SIGNIFICANT CHANGE UP (ref 43–77)
NRBC # FLD: 0 K/UL — SIGNIFICANT CHANGE UP (ref 0–0)
PLATELET # BLD AUTO: 213 K/UL — SIGNIFICANT CHANGE UP (ref 150–400)
PMV BLD: 12.3 FL — SIGNIFICANT CHANGE UP (ref 7–13)
RBC # BLD: 4.33 M/UL — SIGNIFICANT CHANGE UP (ref 3.8–5.2)
RBC # FLD: 14 % — SIGNIFICANT CHANGE UP (ref 10.3–14.5)
RH IG SCN BLD-IMP: POSITIVE — SIGNIFICANT CHANGE UP
WBC # BLD: 12 K/UL — HIGH (ref 3.8–10.5)
WBC # FLD AUTO: 12 K/UL — HIGH (ref 3.8–10.5)

## 2019-06-23 RX ORDER — IBUPROFEN 200 MG
600 TABLET ORAL EVERY 6 HOURS
Refills: 0 | Status: COMPLETED | OUTPATIENT
Start: 2019-06-23 | End: 2020-05-21

## 2019-06-23 RX ORDER — PRAMOXINE HYDROCHLORIDE 150 MG/15G
1 AEROSOL, FOAM RECTAL EVERY 4 HOURS
Refills: 0 | Status: DISCONTINUED | OUTPATIENT
Start: 2019-06-23 | End: 2019-06-25

## 2019-06-23 RX ORDER — OXYCODONE HYDROCHLORIDE 5 MG/1
5 TABLET ORAL ONCE
Refills: 0 | Status: DISCONTINUED | OUTPATIENT
Start: 2019-06-23 | End: 2019-06-25

## 2019-06-23 RX ORDER — BENZOCAINE 10 %
1 GEL (GRAM) MUCOUS MEMBRANE EVERY 6 HOURS
Refills: 0 | Status: DISCONTINUED | OUTPATIENT
Start: 2019-06-23 | End: 2019-06-25

## 2019-06-23 RX ORDER — DOCUSATE SODIUM 100 MG
100 CAPSULE ORAL
Refills: 0 | Status: DISCONTINUED | OUTPATIENT
Start: 2019-06-23 | End: 2019-06-25

## 2019-06-23 RX ORDER — DIBUCAINE 1 %
1 OINTMENT (GRAM) RECTAL EVERY 6 HOURS
Refills: 0 | Status: DISCONTINUED | OUTPATIENT
Start: 2019-06-23 | End: 2019-06-25

## 2019-06-23 RX ORDER — AMPICILLIN TRIHYDRATE 250 MG
2 CAPSULE ORAL ONCE
Refills: 0 | Status: COMPLETED | OUTPATIENT
Start: 2019-06-23 | End: 2019-06-23

## 2019-06-23 RX ORDER — ACETAMINOPHEN 500 MG
975 TABLET ORAL
Refills: 0 | Status: DISCONTINUED | OUTPATIENT
Start: 2019-06-23 | End: 2019-06-25

## 2019-06-23 RX ORDER — OXYTOCIN 10 UNIT/ML
VIAL (ML) INJECTION
Qty: 20 | Refills: 0 | Status: DISCONTINUED | OUTPATIENT
Start: 2019-06-23 | End: 2019-06-23

## 2019-06-23 RX ORDER — TETANUS TOXOID, REDUCED DIPHTHERIA TOXOID AND ACELLULAR PERTUSSIS VACCINE, ADSORBED 5; 2.5; 8; 8; 2.5 [IU]/.5ML; [IU]/.5ML; UG/.5ML; UG/.5ML; UG/.5ML
0.5 SUSPENSION INTRAMUSCULAR ONCE
Refills: 0 | Status: DISCONTINUED | OUTPATIENT
Start: 2019-06-23 | End: 2019-06-25

## 2019-06-23 RX ORDER — AER TRAVELER 0.5 G/1
1 SOLUTION RECTAL; TOPICAL EVERY 4 HOURS
Refills: 0 | Status: DISCONTINUED | OUTPATIENT
Start: 2019-06-23 | End: 2019-06-25

## 2019-06-23 RX ORDER — HYDROCORTISONE 1 %
1 OINTMENT (GRAM) TOPICAL EVERY 6 HOURS
Refills: 0 | Status: DISCONTINUED | OUTPATIENT
Start: 2019-06-23 | End: 2019-06-25

## 2019-06-23 RX ORDER — SODIUM CHLORIDE 9 MG/ML
3 INJECTION INTRAMUSCULAR; INTRAVENOUS; SUBCUTANEOUS EVERY 8 HOURS
Refills: 0 | Status: DISCONTINUED | OUTPATIENT
Start: 2019-06-23 | End: 2019-06-25

## 2019-06-23 RX ORDER — KETOROLAC TROMETHAMINE 30 MG/ML
30 SYRINGE (ML) INJECTION ONCE
Refills: 0 | Status: DISCONTINUED | OUTPATIENT
Start: 2019-06-23 | End: 2019-06-23

## 2019-06-23 RX ORDER — OXYTOCIN 10 UNIT/ML
333.33 VIAL (ML) INJECTION
Qty: 20 | Refills: 0 | Status: DISCONTINUED | OUTPATIENT
Start: 2019-06-23 | End: 2019-06-23

## 2019-06-23 RX ORDER — MAGNESIUM HYDROXIDE 400 MG/1
30 TABLET, CHEWABLE ORAL
Refills: 0 | Status: DISCONTINUED | OUTPATIENT
Start: 2019-06-23 | End: 2019-06-25

## 2019-06-23 RX ORDER — GLYCERIN ADULT
1 SUPPOSITORY, RECTAL RECTAL AT BEDTIME
Refills: 0 | Status: DISCONTINUED | OUTPATIENT
Start: 2019-06-23 | End: 2019-06-25

## 2019-06-23 RX ORDER — IBUPROFEN 200 MG
600 TABLET ORAL EVERY 6 HOURS
Refills: 0 | Status: DISCONTINUED | OUTPATIENT
Start: 2019-06-23 | End: 2019-06-25

## 2019-06-23 RX ORDER — DIPHENHYDRAMINE HCL 50 MG
25 CAPSULE ORAL EVERY 6 HOURS
Refills: 0 | Status: DISCONTINUED | OUTPATIENT
Start: 2019-06-23 | End: 2019-06-25

## 2019-06-23 RX ORDER — AMPICILLIN TRIHYDRATE 250 MG
1 CAPSULE ORAL EVERY 4 HOURS
Refills: 0 | Status: DISCONTINUED | OUTPATIENT
Start: 2019-06-23 | End: 2019-06-23

## 2019-06-23 RX ORDER — SODIUM CHLORIDE 9 MG/ML
1000 INJECTION, SOLUTION INTRAVENOUS
Refills: 0 | Status: DISCONTINUED | OUTPATIENT
Start: 2019-06-23 | End: 2019-06-23

## 2019-06-23 RX ORDER — LANOLIN
1 OINTMENT (GRAM) TOPICAL EVERY 6 HOURS
Refills: 0 | Status: DISCONTINUED | OUTPATIENT
Start: 2019-06-23 | End: 2019-06-25

## 2019-06-23 RX ORDER — SIMETHICONE 80 MG/1
80 TABLET, CHEWABLE ORAL EVERY 4 HOURS
Refills: 0 | Status: DISCONTINUED | OUTPATIENT
Start: 2019-06-23 | End: 2019-06-25

## 2019-06-23 RX ORDER — OXYCODONE HYDROCHLORIDE 5 MG/1
5 TABLET ORAL
Refills: 0 | Status: DISCONTINUED | OUTPATIENT
Start: 2019-06-23 | End: 2019-06-25

## 2019-06-23 RX ADMIN — Medication 975 MILLIGRAM(S): at 21:31

## 2019-06-23 RX ADMIN — SODIUM CHLORIDE 3 MILLILITER(S): 9 INJECTION INTRAMUSCULAR; INTRAVENOUS; SUBCUTANEOUS at 21:47

## 2019-06-23 RX ADMIN — Medication 108 GRAM(S): at 11:35

## 2019-06-23 RX ADMIN — SODIUM CHLORIDE 125 MILLILITER(S): 9 INJECTION, SOLUTION INTRAVENOUS at 07:26

## 2019-06-23 RX ADMIN — Medication 975 MILLIGRAM(S): at 21:01

## 2019-06-23 RX ADMIN — Medication 216 GRAM(S): at 07:25

## 2019-06-23 RX ADMIN — Medication 108 GRAM(S): at 15:33

## 2019-06-23 RX ADMIN — Medication 30 MILLIGRAM(S): at 17:37

## 2019-06-23 NOTE — OB PROVIDER H&P - ASSESSMENT
Patient is a 35y/o  @ 40.6 wks   Admit  Expectant management until after epidural  GBS positive  Ampicillin  EFW 3050 gm    CAT 1 tracing  TOCO: ctx irregular, mild  Denies AP Complications  Meds - pnv  NKDA    OB   @ 37wks IOL 2' Oligo - 2007 - 4lbs    - 9/15/2008 - 6lbds 13oz   - 2010 - 6lbs 7oz   - 2014 - 6lbs 13oz   - 2017 - 6lbs 10oz  Incomp ab x2 with D&C X 2  Comp SAB x1     Discussed with Dr. Nava

## 2019-06-23 NOTE — OB RN PATIENT PROFILE - ALERT: PERTINENT HISTORY
BioPhysical Profile(s)/Ultra Screen at 12 Weeks/Fetal Non-Stress Test (NST)/1st Trimester Sonogram/20 Week Level II Sonogram

## 2019-06-23 NOTE — DISCHARGE NOTE OB - CARE PLAN
Principal Discharge DX:	Labor without complication  Goal:	routine  Assessment and plan of treatment:	routine

## 2019-06-23 NOTE — DISCHARGE NOTE OB - CARE PROVIDER_API CALL
Taz Nava)  Obstetrics and Gynecology  [address] [place] [address]  Phone: [phone]  Fax: [phone]  Follow Up Time:

## 2019-06-23 NOTE — OB PROVIDER TRIAGE NOTE - NSHPPHYSICALEXAM_GEN_ALL_CORE
VS:   FHR: 125, in progress  VE: 2/50/-3, intact VS: 125/79  FHR: 125, in progress  TOCO: in progress  VE: 2/50/-3, intact

## 2019-06-23 NOTE — DISCHARGE NOTE OB - PATIENT PORTAL LINK FT
You can access the [facility] Patient Portal, offered by [facility], by registering with the following website: [email or web address]

## 2019-06-23 NOTE — DISCHARGE NOTE OB - MEDICATION SUMMARY - MEDICATIONS TO TAKE
I will START or STAY ON the medications listed below when I get home from the hospital:  None I will START or STAY ON the medications listed below when I get home from the hospital:    acetaminophen 325 mg oral tablet  -- 3 tab(s) by mouth   -- Indication: For pain, as needed    ibuprofen 600 mg oral tablet  -- 1 tab(s) by mouth every 6 hours  -- Indication: For pain, as needed I will START or STAY ON the medications listed below when I get home from the hospital:    ibuprofen 600 mg oral tablet  -- 1 tab(s) by mouth every 6 hours  -- Indication: For pain, as needed    Tycolene 325 mg oral tablet  -- 3 tab(s) by mouth every 6 hours   -- Indication: For pain I will START or STAY ON the medications listed below when I get home from the hospital:    ibuprofen 600 mg oral tablet  -- 1 tab(s) by mouth every 6 hours  -- Indication: For pain, as needed    acetaminophen 325 mg oral tablet  -- 3 tab(s) by mouth every 6 hours  -- Indication: For pain

## 2019-06-23 NOTE — OB PROVIDER TRIAGE NOTE - NSOBPROVIDERNOTE_OBGYN_ALL_OB_FT
Patient is a 33y/o  @ 40.6 wks       Denies AP Complications  Meds - pnv  NKDA    OB   @ 37wks IOL 2' Oligo - 2007 - 4lbs    - 9/15/2008 - 6lbds 13oz   - 2010 - 6lbs 7oz   - 2014 - 6lbs 13oz   - 2017 - 6lbs 10oz  Incomp ab x2 with D&C X 2  Comp SAB x1     B/P range - /50-68  Adbdomen gravid, soft and nontender  NST - cat 1 fht  Cephalic presentation  PEC Labs - wnl    Discussed patient with Dr Schwarz.  Patient is cleared for discharge home.  To f/u with Dr Nava as scheduled Patient is a 33y/o  @ 40.6 wks   r/o labor      Denies AP Complications  Meds - pnv  NKDA    OB   @ 37wks IOL 2' Oligo - 2007 - 4lbs    - 9/15/2008 - 6lbds 13oz   - 2010 - 6lbs 7oz   - 2014 - 6lbs 13oz   - 2017 - 6lbs 10oz  Incomp ab x2 with D&C X 2  Comp SAB x1     B/P range - /50-68  Adbdomen gravid, soft and nontender  NST - cat 1 fht  Cephalic presentation  PEC Labs - wnl    Discussed patient with Dr Schwarz.  Patient is cleared for discharge home.  To f/u with Dr Nava as scheduled Patient is a 33y/o  @ 40.6 wks   Admit  Expectant management until after epidural  GBS positive  Ampicillin  EFW 3050 gm    CAT 1 tracing  TOCO: ctx irregular, mild  Denies AP Complications  Meds - pnv  NKDA    OB   @ 37wks IOL 2' Oligo - 2007 - 4lbs    - 9/15/2008 - 6lbds 13oz   - 2010 - 6lbs 7oz   - 2014 - 6lbs 13oz   - 2017 - 6lbs 10oz  Incomp ab x2 with D&C X 2  Comp SAB x1

## 2019-06-24 RX ORDER — IBUPROFEN 200 MG
1 TABLET ORAL
Qty: 0 | Refills: 0 | DISCHARGE
Start: 2019-06-24

## 2019-06-24 RX ORDER — ACETAMINOPHEN 500 MG
3 TABLET ORAL
Qty: 0 | Refills: 0 | DISCHARGE
Start: 2019-06-24

## 2019-06-24 RX ADMIN — SODIUM CHLORIDE 3 MILLILITER(S): 9 INJECTION INTRAMUSCULAR; INTRAVENOUS; SUBCUTANEOUS at 05:44

## 2019-06-24 RX ADMIN — Medication 975 MILLIGRAM(S): at 04:07

## 2019-06-24 RX ADMIN — Medication 975 MILLIGRAM(S): at 09:16

## 2019-06-24 RX ADMIN — Medication 975 MILLIGRAM(S): at 10:15

## 2019-06-24 RX ADMIN — Medication 975 MILLIGRAM(S): at 21:20

## 2019-06-24 RX ADMIN — Medication 600 MILLIGRAM(S): at 00:10

## 2019-06-24 RX ADMIN — Medication 600 MILLIGRAM(S): at 18:08

## 2019-06-24 RX ADMIN — Medication 975 MILLIGRAM(S): at 16:14

## 2019-06-24 RX ADMIN — Medication 600 MILLIGRAM(S): at 06:01

## 2019-06-24 RX ADMIN — Medication 1 TABLET(S): at 11:51

## 2019-06-24 RX ADMIN — Medication 600 MILLIGRAM(S): at 18:58

## 2019-06-24 RX ADMIN — Medication 600 MILLIGRAM(S): at 00:40

## 2019-06-24 RX ADMIN — Medication 600 MILLIGRAM(S): at 11:50

## 2019-06-24 RX ADMIN — Medication 975 MILLIGRAM(S): at 14:54

## 2019-06-24 RX ADMIN — Medication 600 MILLIGRAM(S): at 13:00

## 2019-06-24 RX ADMIN — Medication 975 MILLIGRAM(S): at 03:37

## 2019-06-24 RX ADMIN — Medication 975 MILLIGRAM(S): at 22:20

## 2019-06-24 RX ADMIN — Medication 600 MILLIGRAM(S): at 06:31

## 2019-06-24 NOTE — PROGRESS NOTE ADULT - SUBJECTIVE AND OBJECTIVE BOX
S: Patient doing well. Minimal lochia. Pain controlled.    O: Vital Signs Last 24 Hrs  T(C): 36.5 (2019 02:00), Max: 37 (2019 04:16)  T(F): 97.7 (2019 02:00), Max: 98.6 (2019 04:16)  HR: 65 (2019 02:00) (65 - 148)  BP: 125/79 (2019 02:00) (98/53 - 145/64)  BP(mean): --  RR: 17 (2019 02:00) (16 - 19)  SpO2: 100% (2019 02:00) (91% - 100%)    Gen: NAD  Abd: soft, NT, ND, fundus firm below umbilicus  Lochia: moderate  Ext: no tenderness    Labs:                        13.1   12.00 )-----------( 213      ( 2019 07:20 )             38.4       A: 34y PPD#1 s/p  doing well.  Plan: Routine care. DC with instructions for

## 2019-06-25 VITALS
OXYGEN SATURATION: 100 % | DIASTOLIC BLOOD PRESSURE: 64 MMHG | HEART RATE: 68 BPM | RESPIRATION RATE: 16 BRPM | TEMPERATURE: 97 F | SYSTOLIC BLOOD PRESSURE: 107 MMHG

## 2019-06-25 RX ORDER — ACETAMINOPHEN 500 MG
3 TABLET ORAL
Qty: 120 | Refills: 0
Start: 2019-06-25 | End: 2019-07-04

## 2019-06-25 RX ORDER — ACETAMINOPHEN 500 MG
975 TABLET ORAL EVERY 6 HOURS
Refills: 0 | Status: DISCONTINUED | OUTPATIENT
Start: 2019-06-25 | End: 2019-06-25

## 2019-06-25 RX ADMIN — Medication 1 TABLET(S): at 12:08

## 2019-06-25 RX ADMIN — Medication 975 MILLIGRAM(S): at 07:10

## 2019-06-25 RX ADMIN — Medication 975 MILLIGRAM(S): at 08:06

## 2019-06-25 RX ADMIN — Medication 0.5 MILLILITER(S): at 12:09

## 2019-06-25 RX ADMIN — Medication 600 MILLIGRAM(S): at 03:55

## 2019-06-25 RX ADMIN — Medication 600 MILLIGRAM(S): at 12:08

## 2019-06-25 RX ADMIN — Medication 600 MILLIGRAM(S): at 04:34

## 2019-06-26 LAB — T PALLIDUM AB TITR SER: NEGATIVE — SIGNIFICANT CHANGE UP

## 2020-01-08 ENCOUNTER — RESULT REVIEW (OUTPATIENT)
Age: 35
End: 2020-01-08

## 2021-01-13 ENCOUNTER — RESULT REVIEW (OUTPATIENT)
Age: 36
End: 2021-01-13

## 2022-01-29 DIAGNOSIS — R63.5 ABNORMAL WEIGHT GAIN: ICD-10-CM

## 2022-01-29 DIAGNOSIS — N96 RECURRENT PREGNANCY LOSS: ICD-10-CM

## 2022-01-29 DIAGNOSIS — E55.9 VITAMIN D DEFICIENCY, UNSPECIFIED: ICD-10-CM

## 2022-01-29 DIAGNOSIS — R00.2 PALPITATIONS: ICD-10-CM

## 2022-01-29 DIAGNOSIS — F41.9 ANXIETY DISORDER, UNSPECIFIED: ICD-10-CM

## 2022-01-29 DIAGNOSIS — D52.9 FOLATE DEFICIENCY ANEMIA, UNSPECIFIED: ICD-10-CM

## 2022-01-29 DIAGNOSIS — E53.8 DEFICIENCY OF OTHER SPECIFIED B GROUP VITAMINS: ICD-10-CM

## 2022-01-29 DIAGNOSIS — L50.9 URTICARIA, UNSPECIFIED: ICD-10-CM

## 2022-01-29 DIAGNOSIS — E28.2 POLYCYSTIC OVARIAN SYNDROME: ICD-10-CM

## 2022-01-29 DIAGNOSIS — Z80.0 FAMILY HISTORY OF MALIGNANT NEOPLASM OF DIGESTIVE ORGANS: ICD-10-CM

## 2022-01-29 DIAGNOSIS — R01.1 CARDIAC MURMUR, UNSPECIFIED: ICD-10-CM

## 2022-01-29 DIAGNOSIS — H92.01 OTALGIA, RIGHT EAR: ICD-10-CM

## 2022-01-29 DIAGNOSIS — R53.81 OTHER MALAISE: ICD-10-CM

## 2022-01-29 DIAGNOSIS — R14.0 ABDOMINAL DISTENSION (GASEOUS): ICD-10-CM

## 2022-02-01 ENCOUNTER — APPOINTMENT (OUTPATIENT)
Dept: INTERNAL MEDICINE | Facility: CLINIC | Age: 37
End: 2022-02-01

## 2022-03-29 ENCOUNTER — APPOINTMENT (OUTPATIENT)
Dept: ANTEPARTUM | Facility: CLINIC | Age: 37
End: 2022-03-29
Payer: COMMERCIAL

## 2022-03-29 ENCOUNTER — ASOB RESULT (OUTPATIENT)
Age: 37
End: 2022-03-29

## 2022-03-29 PROCEDURE — 76811 OB US DETAILED SNGL FETUS: CPT

## 2022-08-07 ENCOUNTER — INPATIENT (INPATIENT)
Facility: HOSPITAL | Age: 37
LOS: 1 days | Discharge: HOME | End: 2022-08-09
Attending: SPECIALIST | Admitting: SPECIALIST

## 2022-08-07 VITALS — TEMPERATURE: 98 F

## 2022-08-07 DIAGNOSIS — O26.899 OTHER SPECIFIED PREGNANCY RELATED CONDITIONS, UNSPECIFIED TRIMESTER: ICD-10-CM

## 2022-08-07 DIAGNOSIS — Z3A.00 WEEKS OF GESTATION OF PREGNANCY NOT SPECIFIED: ICD-10-CM

## 2022-08-07 DIAGNOSIS — Z98.89 OTHER SPECIFIED POSTPROCEDURAL STATES: Chronic | ICD-10-CM

## 2022-08-07 NOTE — OB RN TRIAGE NOTE - NSICDXPASTMEDICALHX_GEN_ALL_CORE_FT
PAST MEDICAL HISTORY:  2019 novel coronavirus disease (COVID-19)     Missed      Normal vaginal delivery x 5   2007  2008  2010  4# at 37 week  6#13  6#13  6#10

## 2022-08-07 NOTE — OB PROVIDER TRIAGE NOTE - NSHPPHYSICALEXAM_GEN_ALL_CORE
Vital Signs Last 24 Hrs  T(C): 36.5 (07 Aug 2022 23:33), Max: 36.5 (07 Aug 2022 23:20)  T(F): 97.7 (07 Aug 2022 23:33), Max: 97.7 (07 Aug 2022 23:20)  HR: 104 (07 Aug 2022 23:58) (104 - 134)  BP: 119/79 (07 Aug 2022 23:33) (119/79 - 119/79)  RR: 17 (07 Aug 2022 23:33) (17 - 17)  SpO2: 98% (07 Aug 2022 23:58) (98% - 100%)    Gen: NAD  Head: NC/AT  Cardio: RRR  Resp: Clear lung sound bilaterally.   Abdomen: Soft, Non tender  Extremities: No LE edema bilaterally    NST and BPP done to assess fetal surveillance and results as follows:  NST-->FHR: 115 HR baseline, moderate variability, accelerations present, no decelerations, Category 1.  [place]: Contractions present, irregular,    BPP:  vertex confirmed by bedside sonogram    SVE: 1.5/50/-3

## 2022-08-07 NOTE — OB PROVIDER TRIAGE NOTE - ABORTIONS, OB PROFILE
3 · Present on admission as evidenced by creatinine 1 5  · Baseline creatinine 1 1-1 2  · Improved with IV fluids

## 2022-08-07 NOTE — OB PROVIDER TRIAGE NOTE - NSOBPROVIDERNOTE_OBGYN_ALL_OB_FT
38 yo , EGA@39.3 weeks early labor  0010-Plan d/w Dr. Polk  Patient admitted for early labor  For expectant management at this time  For epi PRN  routine orders  meds ordered  covid pcr sent  PPH risk- 2 unit PRBC on hold.   consents signed by patient.    MAME Austin NP

## 2022-08-07 NOTE — OB RN TRIAGE NOTE - NS_OBGYNHISTORY_OBGYN_ALL_OB_FT
X 6   1. 2007 FT M 4#IOL for oligohydramnios   2.  9/15/2018 M FT 6#13  3.  2010 F FT 6#5  4.  9/15/2014 FT F 6#5  5.  2017 FT F 6#8  6.  2019 FT 2019 M 8#9  SAB X3 with D&C X2

## 2022-08-07 NOTE — OB PROVIDER TRIAGE NOTE - HISTORY OF PRESENT ILLNESS
36 yo , EGA@39.3 weeks, presented to D&T with c/o contractions irregular, every 10-15 minutes starting from 1800PM pain 5-7/10, pt also stated that she had denies vaginal bleeding, leakage of fluid, and reports positive fetal movement.  Denies fever, chills, headaches, changes in vision, chest pain, palpitations, shortness of breath, cough, nausea, vomiting, diarrhea, constipation, urinary symptoms, edema.    Prenatal care with Dr. Nava  Prenatal course is uncomplicated as per patient    GBS status is negative   Patient denies signs and symptoms of COVID 19; denies symptomatic illness.    No adverse reactions to anesthesia, no objections to blood transfusions if clinically indicated.  OB hx:   1. 2007 FT M 4#IOL for oligohydramnios   2.  9/15/2018 M FT 6#13  3.  2010 F FT 6#5  4.  9/15/2014 FT F 6#5  5.  2017 FT F 6#8  6.  2019 FT 2019 M 8#9  SAB X3 with D&C X2  Med hx: covid 2020 not hospitalized  Surg hx: D&C x 2  GYN hx: denies hx of abnormal papsmear/cysts/fibroids/STDs  Meds: PNV  Allergies: NKDA    Social hx: Denies alcohol, tobacco, drug use  Psych hx: denies hx of anxiety/depression; lives with spouse     36 yo , EGA@39.3 weeks, presented to D&T with c/o contractions irregular, every 10-15 minutes starting from 1800PM pain 5-7/10, pt also stated that she had 1 episode of vomiting and 4 episode of diarrhea last night resolved, pt denies taking any medications. Patient denies vaginal bleeding, leakage of fluid, and reports positive fetal movement.  Denies fever, chills, headaches, changes in vision, chest pain, palpitations, shortness of breath, cough, nausea, vomiting, diarrhea, constipation, urinary symptoms, edema.    Prenatal care with Dr. Nava  Prenatal course is uncomplicated as per patient    GBS status is negative   Patient denies signs and symptoms of COVID 19; denies symptomatic illness.    No adverse reactions to anesthesia, no objections to blood transfusions if clinically indicated.  OB hx:   1. 2007 FT M 4#IOL for oligohydramnios   2.  9/15/2018 M FT 6#13  3.  2010 F FT 6#5  4.  9/15/2014 FT F 6#5  5.  2017 FT F 6#8  6.  2019 FT 2019 M 8#9  SAB X3 with D&C X2  Med hx: covid 2020 not hospitalized  Surg hx: D&C x 2  GYN hx: denies hx of abnormal papsmear/cysts/fibroids/STDs  Meds: PNV  Allergies: NKDA    Social hx: Denies alcohol, tobacco, drug use  Psych hx: denies hx of anxiety/depression; lives with spouse

## 2022-08-07 NOTE — OB RN TRIAGE NOTE - FALL HARM RISK - UNIVERSAL INTERVENTIONS
Bed in lowest position, wheels locked, appropriate side rails in place/Call bell, personal items and telephone in reach/Instruct patient to call for assistance before getting out of bed or chair/Non-slip footwear when patient is out of bed/[place] to call system/Physically safe environment - no spills, clutter or unnecessary equipment/Purposeful Proactive Rounding/Room/bathroom lighting operational, light cord in reach

## 2022-08-07 NOTE — OB RN TRIAGE NOTE - CHIEF COMPLAINT QUOTE
"Contractions are painful , want epidural " "Contractions are painful , want epidural , I have nausea, vomiting and diarrhea for the last 24 hours"

## 2022-08-08 ENCOUNTER — TRANSCRIPTION ENCOUNTER (OUTPATIENT)
Age: 37
End: 2022-08-08

## 2022-08-08 LAB
BASOPHILS # BLD AUTO: 0.03 K/UL — SIGNIFICANT CHANGE UP (ref 0–0.2)
BASOPHILS NFR BLD AUTO: 0.3 % — SIGNIFICANT CHANGE UP (ref 0–2)
BLD GP AB SCN SERPL QL: NEGATIVE — SIGNIFICANT CHANGE UP
COVID-19 SPIKE DOMAIN AB INTERP: POSITIVE
COVID-19 SPIKE DOMAIN ANTIBODY RESULT: >250 U/ML — HIGH
EOSINOPHIL # BLD AUTO: 0.03 K/UL — SIGNIFICANT CHANGE UP (ref 0–0.5)
EOSINOPHIL NFR BLD AUTO: 0.3 % — SIGNIFICANT CHANGE UP (ref 0–6)
HCT VFR BLD CALC: 37.6 % — SIGNIFICANT CHANGE UP (ref 34.5–45)
HGB BLD-MCNC: 13 G/DL — SIGNIFICANT CHANGE UP (ref 11.5–15.5)
IANC: 8.33 K/UL — HIGH (ref 1.8–7.4)
IMM GRANULOCYTES NFR BLD AUTO: 0.6 % — SIGNIFICANT CHANGE UP (ref 0–1.5)
LYMPHOCYTES # BLD AUTO: 1.24 K/UL — SIGNIFICANT CHANGE UP (ref 1–3.3)
LYMPHOCYTES # BLD AUTO: 11.9 % — LOW (ref 13–44)
MCHC RBC-ENTMCNC: 30.5 PG — SIGNIFICANT CHANGE UP (ref 27–34)
MCHC RBC-ENTMCNC: 34.6 GM/DL — SIGNIFICANT CHANGE UP (ref 32–36)
MCV RBC AUTO: 88.3 FL — SIGNIFICANT CHANGE UP (ref 80–100)
MONOCYTES # BLD AUTO: 0.73 K/UL — SIGNIFICANT CHANGE UP (ref 0–0.9)
MONOCYTES NFR BLD AUTO: 7 % — SIGNIFICANT CHANGE UP (ref 2–14)
NEUTROPHILS # BLD AUTO: 8.33 K/UL — HIGH (ref 1.8–7.4)
NEUTROPHILS NFR BLD AUTO: 79.9 % — HIGH (ref 43–77)
NRBC # BLD: 0 /100 WBCS — SIGNIFICANT CHANGE UP
NRBC # FLD: 0 K/UL — SIGNIFICANT CHANGE UP
PLATELET # BLD AUTO: 184 K/UL — SIGNIFICANT CHANGE UP (ref 150–400)
RBC # BLD: 4.26 M/UL — SIGNIFICANT CHANGE UP (ref 3.8–5.2)
RBC # FLD: 14 % — SIGNIFICANT CHANGE UP (ref 10.3–14.5)
RH IG SCN BLD-IMP: POSITIVE — SIGNIFICANT CHANGE UP
RH IG SCN BLD-IMP: POSITIVE — SIGNIFICANT CHANGE UP
SARS-COV-2 IGG+IGM SERPL QL IA: >250 U/ML — HIGH
SARS-COV-2 IGG+IGM SERPL QL IA: POSITIVE
SARS-COV-2 RNA SPEC QL NAA+PROBE: SIGNIFICANT CHANGE UP
T PALLIDUM AB TITR SER: NEGATIVE — SIGNIFICANT CHANGE UP
WBC # BLD: 10.42 K/UL — SIGNIFICANT CHANGE UP (ref 3.8–10.5)
WBC # FLD AUTO: 10.42 K/UL — SIGNIFICANT CHANGE UP (ref 3.8–10.5)

## 2022-08-08 PROCEDURE — 76810 OB US >/= 14 WKS ADDL FETUS: CPT | Mod: 26

## 2022-08-08 PROCEDURE — 76805 OB US >/= 14 WKS SNGL FETUS: CPT | Mod: 26

## 2022-08-08 PROCEDURE — 59025 FETAL NON-STRESS TEST: CPT | Mod: 26

## 2022-08-08 RX ORDER — IBUPROFEN 200 MG
600 TABLET ORAL EVERY 6 HOURS
Refills: 0 | Status: DISCONTINUED | OUTPATIENT
Start: 2022-08-08 | End: 2022-08-09

## 2022-08-08 RX ORDER — LANOLIN
1 OINTMENT (GRAM) TOPICAL EVERY 6 HOURS
Refills: 0 | Status: DISCONTINUED | OUTPATIENT
Start: 2022-08-08 | End: 2022-08-09

## 2022-08-08 RX ORDER — OXYTOCIN 10 UNIT/ML
333.33 VIAL (ML) INJECTION
Qty: 20 | Refills: 0 | Status: DISCONTINUED | OUTPATIENT
Start: 2022-08-08 | End: 2022-08-09

## 2022-08-08 RX ORDER — HYDROCORTISONE 1 %
1 OINTMENT (GRAM) TOPICAL EVERY 6 HOURS
Refills: 0 | Status: DISCONTINUED | OUTPATIENT
Start: 2022-08-08 | End: 2022-08-09

## 2022-08-08 RX ORDER — OXYTOCIN 10 UNIT/ML
2 VIAL (ML) INJECTION
Qty: 30 | Refills: 0 | Status: DISCONTINUED | OUTPATIENT
Start: 2022-08-08 | End: 2022-08-08

## 2022-08-08 RX ORDER — KETOROLAC TROMETHAMINE 30 MG/ML
30 SYRINGE (ML) INJECTION ONCE
Refills: 0 | Status: DISCONTINUED | OUTPATIENT
Start: 2022-08-08 | End: 2022-08-08

## 2022-08-08 RX ORDER — PRAMOXINE HYDROCHLORIDE 150 MG/15G
1 AEROSOL, FOAM RECTAL EVERY 4 HOURS
Refills: 0 | Status: DISCONTINUED | OUTPATIENT
Start: 2022-08-08 | End: 2022-08-09

## 2022-08-08 RX ORDER — AER TRAVELER 0.5 G/1
1 SOLUTION RECTAL; TOPICAL EVERY 4 HOURS
Refills: 0 | Status: DISCONTINUED | OUTPATIENT
Start: 2022-08-08 | End: 2022-08-09

## 2022-08-08 RX ORDER — DIPHENHYDRAMINE HCL 50 MG
25 CAPSULE ORAL EVERY 6 HOURS
Refills: 0 | Status: DISCONTINUED | OUTPATIENT
Start: 2022-08-08 | End: 2022-08-09

## 2022-08-08 RX ORDER — OXYCODONE HYDROCHLORIDE 5 MG/1
5 TABLET ORAL
Refills: 0 | Status: DISCONTINUED | OUTPATIENT
Start: 2022-08-08 | End: 2022-08-09

## 2022-08-08 RX ORDER — TETANUS TOXOID, REDUCED DIPHTHERIA TOXOID AND ACELLULAR PERTUSSIS VACCINE, ADSORBED 5; 2.5; 8; 8; 2.5 [IU]/.5ML; [IU]/.5ML; UG/.5ML; UG/.5ML; UG/.5ML
0.5 SUSPENSION INTRAMUSCULAR ONCE
Refills: 0 | Status: DISCONTINUED | OUTPATIENT
Start: 2022-08-08 | End: 2022-08-09

## 2022-08-08 RX ORDER — SODIUM CHLORIDE 9 MG/ML
3 INJECTION INTRAMUSCULAR; INTRAVENOUS; SUBCUTANEOUS EVERY 8 HOURS
Refills: 0 | Status: DISCONTINUED | OUTPATIENT
Start: 2022-08-08 | End: 2022-08-09

## 2022-08-08 RX ORDER — BENZOCAINE 10 %
1 GEL (GRAM) MUCOUS MEMBRANE EVERY 6 HOURS
Refills: 0 | Status: DISCONTINUED | OUTPATIENT
Start: 2022-08-08 | End: 2022-08-09

## 2022-08-08 RX ORDER — IBUPROFEN 200 MG
600 TABLET ORAL EVERY 6 HOURS
Refills: 0 | Status: COMPLETED | OUTPATIENT
Start: 2022-08-08 | End: 2023-07-07

## 2022-08-08 RX ORDER — DIBUCAINE 1 %
1 OINTMENT (GRAM) RECTAL EVERY 6 HOURS
Refills: 0 | Status: DISCONTINUED | OUTPATIENT
Start: 2022-08-08 | End: 2022-08-09

## 2022-08-08 RX ORDER — OXYTOCIN 10 UNIT/ML
333.33 VIAL (ML) INJECTION
Qty: 20 | Refills: 0 | Status: DISCONTINUED | OUTPATIENT
Start: 2022-08-08 | End: 2022-08-08

## 2022-08-08 RX ORDER — SIMETHICONE 80 MG/1
80 TABLET, CHEWABLE ORAL EVERY 4 HOURS
Refills: 0 | Status: DISCONTINUED | OUTPATIENT
Start: 2022-08-08 | End: 2022-08-09

## 2022-08-08 RX ORDER — OXYCODONE HYDROCHLORIDE 5 MG/1
5 TABLET ORAL ONCE
Refills: 0 | Status: DISCONTINUED | OUTPATIENT
Start: 2022-08-08 | End: 2022-08-09

## 2022-08-08 RX ORDER — SODIUM CHLORIDE 9 MG/ML
1000 INJECTION, SOLUTION INTRAVENOUS
Refills: 0 | Status: DISCONTINUED | OUTPATIENT
Start: 2022-08-08 | End: 2022-08-08

## 2022-08-08 RX ORDER — SODIUM CHLORIDE 9 MG/ML
1000 INJECTION, SOLUTION INTRAVENOUS ONCE
Refills: 0 | Status: DISCONTINUED | OUTPATIENT
Start: 2022-08-08 | End: 2022-08-09

## 2022-08-08 RX ORDER — CHLORHEXIDINE GLUCONATE 213 G/1000ML
1 SOLUTION TOPICAL ONCE
Refills: 0 | Status: DISCONTINUED | OUTPATIENT
Start: 2022-08-08 | End: 2022-08-08

## 2022-08-08 RX ORDER — MAGNESIUM HYDROXIDE 400 MG/1
30 TABLET, CHEWABLE ORAL
Refills: 0 | Status: DISCONTINUED | OUTPATIENT
Start: 2022-08-08 | End: 2022-08-09

## 2022-08-08 RX ORDER — ACETAMINOPHEN 500 MG
975 TABLET ORAL
Refills: 0 | Status: DISCONTINUED | OUTPATIENT
Start: 2022-08-08 | End: 2022-08-09

## 2022-08-08 RX ADMIN — SODIUM CHLORIDE 125 MILLILITER(S): 9 INJECTION, SOLUTION INTRAVENOUS at 02:15

## 2022-08-08 RX ADMIN — Medication 600 MILLIGRAM(S): at 18:40

## 2022-08-08 RX ADMIN — Medication 30 MILLIGRAM(S): at 12:17

## 2022-08-08 RX ADMIN — Medication 975 MILLIGRAM(S): at 20:22

## 2022-08-08 RX ADMIN — Medication 1000 MILLIUNIT(S)/MIN: at 12:17

## 2022-08-08 RX ADMIN — Medication 2 MILLIUNIT(S)/MIN: at 09:40

## 2022-08-08 RX ADMIN — Medication 600 MILLIGRAM(S): at 18:10

## 2022-08-08 RX ADMIN — Medication 975 MILLIGRAM(S): at 21:22

## 2022-08-08 NOTE — CHART NOTE - NSCHARTNOTEFT_GEN_A_CORE
Evaluated patient who is now P7 delivered at 10:20am. She is still feeling nauseous, lightheaded and dizzy. Patient is tolerating clear liquids, no emesis. Patient denies CP, SOB, changes in vision.     EBL: 280    T(C): 36.9 (22 @ 10:45), Max: 37.0 (22 @ 05:00)  HR: 66 (22 @ 14:11) (56 - 136)  BP: 108/53 (22 @ 14:02) (87/50 - 127/69)  RR: 17 (22 @ 01:02) (17 - 17)  SpO2: 99% (22 @ 14:11) (70% - 100%)    Gen: NAD  CV: RRR, physiologic S1, S2  Pulm: CTAB  Abd: fundus firm @umbilicus, no vaginal bleeding with firm fundal pressure    P7 PPD#0 from uncomplicated  who feels nauseous, lightheaded and dizzy. Patient is hemodynamically stable with normal vital signs  - CTM VS q15 min  - Advance to regular diet  - Zofran for nausea PRN    d/w Dr. Guido Winters, PGY2

## 2022-08-08 NOTE — OB PROVIDER H&P - NSHPPHYSICALEXAM_GEN_ALL_CORE
Vital Signs Last 24 Hrs  T(C): 36.5 (07 Aug 2022 23:33), Max: 36.5 (07 Aug 2022 23:20)  T(F): 97.7 (07 Aug 2022 23:33), Max: 97.7 (07 Aug 2022 23:20)  HR: 104 (07 Aug 2022 23:58) (104 - 134)  BP: 119/79 (07 Aug 2022 23:33) (119/79 - 119/79)  RR: 17 (07 Aug 2022 23:33) (17 - 17)  SpO2: 98% (07 Aug 2022 23:58) (98% - 100%)    Gen: NAD  Head: NC/AT  Cardio: RRR  Resp: Clear lung sound bilaterally.   Abdomen: Soft, Non tender  Extremities: No LE edema bilaterally    NST and BPP done to assess fetal surveillance and results as follows:  NST-->FHR: 115 HR baseline, moderate variability, accelerations present, no decelerations, Category 1.  [place]: Contractions present, irregular,    BPP:  vertex confirmed by bedside sonogram    SVE: 1.5/50/-3

## 2022-08-08 NOTE — OB RN PATIENT PROFILE - EDUCATION ON THE POTENTIAL RISKS AND IMPACT OF EARLY USE OF PACIFIERS ON THE ESTABLISHMENT OF BREASTFEEDING
Pt c/o possible foreign body on her left eye, was at work when she felt something on her eye Statement Selected

## 2022-08-08 NOTE — OB PROVIDER LABOR PROGRESS NOTE - NS_SUBJECTIVE/OBJECTIVE_OBGYN_ALL_OB_FT
Checked patient after spontaneous rupture of membranes at 7am. Fluid was clear.
R1 Labor & Delivery Progress Note     Pt seen & examined at bedside for repeat SVE after epidural. Pt comfortable with epidural in place.    T(C): 36.7 (08-08-22 @ 01:02), Max: 36.7 (08-08-22 @ 01:02)  HR: 96 (08-08-22 @ 02:34) (56 - 134)  BP: 96/60 (08-08-22 @ 02:29) (87/50 - 127/58)  RR: 17 (08-08-22 @ 01:02) (17 - 17)  SpO2: 100% (08-08-22 @ 02:34) (70% - 100%)
Patient seen and examined for c/o rectal pressure
Patient seen for c/o rectal pressure

## 2022-08-08 NOTE — OB PROVIDER LABOR PROGRESS NOTE - NS_OBIHIFHRDETAILS_OBGYN_ALL_OB_FT
120bpm, moderate variability, +accels, no decels
baseline 125, moderate variability, accel present, no decels
EFM: baseline 140, mod. variability/+accels/-decels
120bpm, moderate variability, +accels, no decels

## 2022-08-08 NOTE — OB PROVIDER DELIVERY SUMMARY - NSPROVIDERDELIVERYNOTE_OBGYN_ALL_OB_FT
Called to the Labor room 4 to evaluate patient. Patient was also found to be FD and +2 station. Patient was instructed to push. Patient had an atraumatic  of a live female infant in [place] with nuchal x1. Apfars . Delayed cord clamping was done and cord was obtained for cord gases. The placenta delivered spontaneously and intact, 3 vessels noted. 0.2mg IM methergine x1 given for bleeding prophylaxis. Upon inspection of the perineum, no lacerations were visualized. The baby and mother bonded well and the EBL 280cc.    Laps and sharp count were correct.

## 2022-08-08 NOTE — OB RN DELIVERY SUMMARY - NS_SEPSISRSKCALC_OBGYN_ALL_OB_FT
EOS calculated successfully. EOS Risk Factor: 0.5/1000 live births ([facility] national incidence); GA=39w4d; Temp=98.6; ROM=3.333; GBS='Negative'; Antibiotics='No antibiotics or any antibiotics < 2 hrs prior to birth'

## 2022-08-08 NOTE — DISCHARGE NOTE OB - CARE PLAN
1 Principal Discharge DX:	Normal vaginal delivery  Assessment and plan of treatment:	follow up 6 weeks

## 2022-08-08 NOTE — OB PROVIDER LABOR PROGRESS NOTE - ASSESSMENT
@ 39w4d IOL making cervical change    -Epidural in place  -Dr. Lora in OR  -Service attending Dr. Walton made aware of possible delivery while covering attending is in OR    Keyonna RENEE-BC
Plan: 37y y/o  @ in stable condition.  - Start PO Cytotec  - Continuous EFM, [place]  - Con't IVF    d/w attending physician Dr. Felicitas Harrison, PGY-1
 @ 39w4d IOL    -Discussed with Dr. Lora who is in-house  -Patient ordered for [facility]-BC
Spontaneous labor and spontaneous ROM  - expectant management    Odalis Woodward, PGY1  d/w Dr. Lora

## 2022-08-08 NOTE — DISCHARGE NOTE OB - MEDICATION SUMMARY - MEDICATIONS TO TAKE
I will START or STAY ON the medications listed below when I get home from the hospital:    ibuprofen 600 mg oral tablet  -- 1 tab(s) by mouth every 6 hours  -- Indication: For pain    acetaminophen 325 mg oral tablet  -- 3 tab(s) by mouth 4 times a day, As Needed - for moderate pain  -- Indication: For pain

## 2022-08-08 NOTE — OB PROVIDER H&P - HISTORY OF PRESENT ILLNESS
36 yo , EGA@39.3 weeks, presented to D&T with c/o contractions irregular, every 10-15 minutes starting from 1800PM pain 5-7/10, pt also stated that she had 1 episode of vomiting and 4 episode of diarrhea last night resolved, pt denies taking any medications. Patient denies vaginal bleeding, leakage of fluid, and reports positive fetal movement.  Denies fever, chills, headaches, changes in vision, chest pain, palpitations, shortness of breath, cough, nausea, vomiting, diarrhea, constipation, urinary symptoms, edema.    Prenatal care with Dr. Nava  Prenatal course is uncomplicated as per patient    GBS status is negative 22  Patient denies signs and symptoms of COVID 19; denies symptomatic illness.    No adverse reactions to anesthesia, no objections to blood transfusions if clinically indicated.  OB hx:   1. 2007 FT M 4#IOL for oligohydramnios   2.  9/15/2018 M FT 6#13  3.  2010 F FT 6#5  4.  9/15/2014 FT F 6#5  5.  2017 FT F 6#8  6.  2019 FT 2019 M 8#9  SAB X3 with D&C X2  Med hx: covid 2020 not hospitalized  Surg hx: D&C x 2  GYN hx: denies hx of abnormal papsmear/cysts/fibroids/STDs  Meds: PNV  Allergies: NKDA    Social hx: Denies alcohol, tobacco, drug use  Psych hx: denies hx of anxiety/depression; lives with spouse

## 2022-08-08 NOTE — OB PROVIDER DELIVERY SUMMARY - NSSELHIDDEN_OBGYN_ALL_OB_FT
[NS_DeliveryAttending1_OBGYN_ALL_OB_FT:[id number]==],[NS_DeliveryAttending2_OBGYN_ALL_OB_FT:[id number]=]

## 2022-08-08 NOTE — OB PROVIDER H&P - ASSESSMENT
36 yo , EGA@39.3 weeks early labor  0010-Plan d/w Dr. Polk  Patient admitted for early labor  For expectant management at this time  For epi PRN  routine orders  meds ordered  covid pcr sent  PPH risk- 2 unit PRBC on hold.   consents signed by patient.  50 discuss with Dr. Quiñones PGY-3    MAME Austin NP

## 2022-08-08 NOTE — OB RN PATIENT PROFILE - FALL HARM RISK - UNIVERSAL INTERVENTIONS
Bed in lowest position, wheels locked, appropriate side rails in place/Call bell, personal items and telephone in reach/Instruct patient to call for assistance before getting out of bed or chair/Non-slip footwear when patient is out of bed/[place] to call system/Physically safe environment - no spills, clutter or unnecessary equipment/Purposeful Proactive Rounding/Room/bathroom lighting operational, light cord in reach

## 2022-08-08 NOTE — DISCHARGE NOTE OB - PATIENT PORTAL LINK FT
You can access the FollowMyHealth Patient Portal offered by [facility] by registering at the following website: [email or web address]. By joining [employer]’s FollowMyHealth portal, you will also be able to view your health information using other applications (apps) compatible with our system.

## 2022-08-08 NOTE — DISCHARGE NOTE OB - CARE PROVIDER_API CALL
Taz Nava)  Obstetrics and Gynecology  [address] [place] [address]  Phone: [phone]  Fax: [phone]  Follow Up Time:

## 2022-08-08 NOTE — OB RN DELIVERY SUMMARY - NSSELHIDDEN_OBGYN_ALL_OB_FT
[NS_DeliveryAttending1_OBGYN_ALL_OB_FT:[id number]],[NS_DeliveryAttending2_OBGYN_ALL_OB_FT:[email or web address]],[NS_DeliveryRN_OBGYN_ALL_OB_FT:[id number]=]

## 2022-08-09 VITALS
RESPIRATION RATE: 18 BRPM | DIASTOLIC BLOOD PRESSURE: 59 MMHG | TEMPERATURE: 99 F | OXYGEN SATURATION: 99 % | HEART RATE: 77 BPM | SYSTOLIC BLOOD PRESSURE: 110 MMHG

## 2022-08-09 RX ORDER — ACETAMINOPHEN 500 MG
3 TABLET ORAL
Qty: 0 | Refills: 0 | DISCHARGE
Start: 2022-08-09

## 2022-08-09 RX ORDER — IBUPROFEN 200 MG
1 TABLET ORAL
Qty: 0 | Refills: 0 | DISCHARGE
Start: 2022-08-09

## 2022-08-09 RX ADMIN — Medication 975 MILLIGRAM(S): at 08:49

## 2022-08-09 RX ADMIN — Medication 975 MILLIGRAM(S): at 09:30

## 2022-08-09 RX ADMIN — Medication 600 MILLIGRAM(S): at 06:39

## 2022-08-09 RX ADMIN — Medication 600 MILLIGRAM(S): at 00:06

## 2022-08-09 RX ADMIN — Medication 600 MILLIGRAM(S): at 06:06

## 2022-08-09 RX ADMIN — Medication 600 MILLIGRAM(S): at 12:07

## 2022-08-09 RX ADMIN — Medication 975 MILLIGRAM(S): at 03:17

## 2022-08-09 RX ADMIN — Medication 600 MILLIGRAM(S): at 01:06

## 2022-08-09 RX ADMIN — Medication 975 MILLIGRAM(S): at 02:17

## 2022-08-09 NOTE — PROGRESS NOTE ADULT - SUBJECTIVE AND OBJECTIVE BOX
S: Patient doing well. Minimal lochia. Pain controlled. breastfeeding and bottlefeeding    O: Vital Signs Last 24 Hrs  T(C): 36.8 (09 Aug 2022 06:29), Max: 36.9 (08 Aug 2022 10:45)  T(F): 98.2 (09 Aug 2022 06:29), Max: 98.4 (08 Aug 2022 10:45)  HR: 63 (09 Aug 2022 06:29) (63 - 130)  BP: 103/70 (09 Aug 2022 06:29) (96/50 - 127/69)  BP(mean): --  RR: 16 (09 Aug 2022 06:29) (15 - 17)  SpO2: 98% (09 Aug 2022 06:29) (81% - 100%)    Parameters below as of 09 Aug 2022 06:29  Patient On (Oxygen Delivery Method): room air        Gen: NAD  Abd: soft, NT, ND, fundus firm below umbilicus  Lochia: moderate  Ext: no tenderness    Labs:                        13.0   10.42 )-----------( 184      ( 08 Aug 2022 00:40 )             37.6       A: 37y PPD#1 s/p  doing well. Requesting discharge    Plan: Continue routine postpartum care.             Discharge home today

## 2022-12-13 NOTE — OB PROVIDER H&P - NS_EFFACEMANT_OBGYN_ALL_OB_NU
A/P  1.) Dry Eye OU  -s/p BMT 08/2021  -Worsening dryness right eye>left eye, symptomatic for photophobia/tearing  -Failed: Restasis/Xiidra (stinging), plugs (scarred puncta)  -Now not tolerating BCL (does not retain in the eye). Tapering off oral steroids, eyes significantly worse. Right eye >>>> left eye  -Difficulty with scleral lens handling to start - 2 trial lenses broke. Good comfort when in. Good fit/comfort/vision with Rx lens today  -Both eyes have felt better lately, systemic meds may be working now.   -Dispensed lens today. Will reorder another spare to have and mail direct.   -Continue FML bid     2.) Hyperopia/Presbyopia OU  -Previously BCVA 20/20 with correction. Uses low plus readers for distance and higher plus readers for near  -Likely having cataract progression 2' to steroid use but monitor for now    Dispensed lens. Order duplicate and mail direct. F/u January - MyChart/call with any new lens issues/breakage etc    I have confirmed the patient's CC, HPI and reviewed Past Medical History, Past Surgical History, Social History, Family History, Problem List, Medication List and agree with Tech note.     [facility] Juarez, [facility]    Contact Lens Billing  V-Code:  - Scleral Cover Shell  Final Contact Lens Rx       Brand Base Curve Diameter Sphere Lens Addl. Specs    Right Onefit 8.2 15.2 +2.75 std edge Opt Extra clear, HydraPEG    Left               # of units: 1  Price per Unit: $225    This patient requires contact lenses that are medically necessary for either improvement in vision over spectacles, support of the ocular surface, or other therapeutic benefit. These are not cosmetic contact lenses.     Encounter Diagnoses   Name Primary?     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

## 2023-01-10 PROBLEM — U07.1 COVID-19: Chronic | Status: ACTIVE | Noted: 2022-08-07

## 2023-01-13 ENCOUNTER — TRANSCRIPTION ENCOUNTER (OUTPATIENT)
Age: 38
End: 2023-01-13

## 2023-01-13 ENCOUNTER — NON-APPOINTMENT (OUTPATIENT)
Age: 38
End: 2023-01-13

## 2023-01-13 ENCOUNTER — APPOINTMENT (OUTPATIENT)
Dept: INTERNAL MEDICINE | Facility: CLINIC | Age: 38
End: 2023-01-13
Payer: COMMERCIAL

## 2023-01-13 VITALS
RESPIRATION RATE: 17 BRPM | HEART RATE: 101 BPM | BODY MASS INDEX: 28 KG/M2 | WEIGHT: 164 LBS | DIASTOLIC BLOOD PRESSURE: 80 MMHG | TEMPERATURE: 97.8 F | OXYGEN SATURATION: 99 % | SYSTOLIC BLOOD PRESSURE: 120 MMHG | HEIGHT: 64 IN

## 2023-01-13 DIAGNOSIS — R53.83 OTHER FATIGUE: ICD-10-CM

## 2023-01-13 DIAGNOSIS — Z13.6 ENCOUNTER FOR SCREENING FOR CARDIOVASCULAR DISORDERS: ICD-10-CM

## 2023-01-13 DIAGNOSIS — E07.89 OTHER SPECIFIED DISORDERS OF THYROID: ICD-10-CM

## 2023-01-13 DIAGNOSIS — E61.1 IRON DEFICIENCY: ICD-10-CM

## 2023-01-13 DIAGNOSIS — Z00.00 ENCOUNTER FOR GENERAL ADULT MEDICAL EXAMINATION W/OUT ABNORMAL FINDINGS: ICD-10-CM

## 2023-01-13 PROCEDURE — 99395 PREV VISIT EST AGE 18-39: CPT | Mod: 25

## 2023-01-13 PROCEDURE — 93000 ELECTROCARDIOGRAM COMPLETE: CPT

## 2023-01-13 PROCEDURE — 99214 OFFICE O/P EST MOD 30 MIN: CPT | Mod: 25

## 2023-01-17 LAB
25(OH)D3 SERPL-MCNC: 32.1 NG/ML
ALBUMIN SERPL ELPH-MCNC: 4.6 G/DL
ALP BLD-CCNC: 80 U/L
ALT SERPL-CCNC: 16 U/L
ANION GAP SERPL CALC-SCNC: 10 MMOL/L
AST SERPL-CCNC: 15 U/L
BASOPHILS # BLD AUTO: 0.03 K/UL
BASOPHILS NFR BLD AUTO: 0.5 %
BILIRUB SERPL-MCNC: 0.3 MG/DL
BUN SERPL-MCNC: 12 MG/DL
CALCIUM SERPL-MCNC: 9.7 MG/DL
CHLORIDE SERPL-SCNC: 105 MMOL/L
CHOLEST SERPL-MCNC: 204 MG/DL
CO2 SERPL-SCNC: 27 MMOL/L
CREAT SERPL-MCNC: 0.61 MG/DL
EGFR: 118 ML/MIN/1.73M2
EOSINOPHIL # BLD AUTO: 0.12 K/UL
EOSINOPHIL NFR BLD AUTO: 1.9 %
ESTIMATED AVERAGE GLUCOSE: 91 MG/DL
FERRITIN SERPL-MCNC: 97 NG/ML
FOLATE SERPL-MCNC: >20 NG/ML
GLUCOSE SERPL-MCNC: 84 MG/DL
HBA1C MFR BLD HPLC: 4.8 %
HCT VFR BLD CALC: 43.6 %
HDLC SERPL-MCNC: 84 MG/DL
HGB BLD-MCNC: 14.1 G/DL
IMM GRANULOCYTES NFR BLD AUTO: 0.2 %
IRON SATN MFR SERPL: 13 %
IRON SERPL-MCNC: 42 UG/DL
LDLC SERPL CALC-MCNC: 109 MG/DL
LYMPHOCYTES # BLD AUTO: 1.41 K/UL
LYMPHOCYTES NFR BLD AUTO: 22.7 %
MAN DIFF?: NORMAL
MCHC RBC-ENTMCNC: 29.1 PG
MCHC RBC-ENTMCNC: 32.3 GM/DL
MCV RBC AUTO: 89.9 FL
MONOCYTES # BLD AUTO: 0.6 K/UL
MONOCYTES NFR BLD AUTO: 9.7 %
NEUTROPHILS # BLD AUTO: 4.04 K/UL
NEUTROPHILS NFR BLD AUTO: 65 %
NONHDLC SERPL-MCNC: 120 MG/DL
PLATELET # BLD AUTO: 208 K/UL
POTASSIUM SERPL-SCNC: 5.4 MMOL/L
PROT SERPL-MCNC: 7.2 G/DL
RBC # BLD: 4.85 M/UL
RBC # FLD: 13.3 %
SODIUM SERPL-SCNC: 142 MMOL/L
T3 SERPL-MCNC: 106 NG/DL
T4 FREE SERPL-MCNC: 1 NG/DL
T4 SERPL-MCNC: 6.7 UG/DL
TIBC SERPL-MCNC: 319 UG/DL
TRIGL SERPL-MCNC: 52 MG/DL
TSH SERPL-ACNC: 0.86 UIU/ML
UIBC SERPL-MCNC: 278 UG/DL
VIT B12 SERPL-MCNC: 549 PG/ML
WBC # FLD AUTO: 6.21 K/UL

## 2023-01-19 PROBLEM — E07.89 THYROID FULLNESS: Status: ACTIVE | Noted: 2023-01-13

## 2023-01-19 PROBLEM — Z13.6 SCREENING FOR HEART DISEASE: Status: ACTIVE | Noted: 2023-01-13

## 2023-01-19 PROBLEM — Z00.00 PREVENTATIVE HEALTH CARE: Status: ACTIVE | Noted: 2023-01-13

## 2023-01-19 PROBLEM — R53.83 FATIGUE: Status: ACTIVE | Noted: 2022-01-29

## 2023-01-23 PROBLEM — E61.1 LOW IRON: Status: ACTIVE | Noted: 2023-01-23

## 2023-01-23 NOTE — PHYSICAL EXAM
[No Acute Distress] : no acute distress [Well Nourished] : well nourished [Well Developed] : well developed [Well-Appearing] : well-appearing [Normal Sclera/Conjunctiva] : normal sclera/conjunctiva [PERRL] : pupils equal round and reactive to light [EOMI] : extraocular movements intact [Normal Outer Ear/Nose] : the outer ears and nose were normal in appearance [Normal Oropharynx] : the oropharynx was normal [No JVD] : no jugular venous distention [No Lymphadenopathy] : no lymphadenopathy [Supple] : supple [No Respiratory Distress] : no respiratory distress  [No Accessory Muscle Use] : no accessory muscle use [Clear to Auscultation] : lungs were clear to auscultation bilaterally [Normal Rate] : normal rate  [Regular Rhythm] : with a regular rhythm [Normal S1, S2] : normal S1 and S2 [No Murmur] : no murmur heard [No Carotid Bruits] : no carotid bruits [No Abdominal Bruit] : a ~M bruit was not heard ~T in the abdomen [No Varicosities] : no varicosities [Pedal Pulses Present] : the pedal pulses are present [No Edema] : there was no peripheral edema [No Palpable Aorta] : no palpable aorta [No Extremity Clubbing/Cyanosis] : no extremity clubbing/cyanosis [Soft] : abdomen soft [Non Tender] : non-tender [Non-distended] : non-distended [No Masses] : no abdominal mass palpated [No HSM] : no HSM [Normal Bowel Sounds] : normal bowel sounds [Normal Posterior Cervical Nodes] : no posterior cervical lymphadenopathy [Normal Anterior Cervical Nodes] : no anterior cervical lymphadenopathy [No CVA Tenderness] : no CVA  tenderness [No Spinal Tenderness] : no spinal tenderness [No Joint Swelling] : no joint swelling [Grossly Normal Strength/Tone] : grossly normal strength/tone [No Rash] : no rash [Coordination Grossly Intact] : coordination grossly intact [No Focal Deficits] : no focal deficits [Normal Gait] : normal gait [Deep Tendon Reflexes (DTR)] : deep tendon reflexes were 2+ and symmetric [Normal Affect] : the affect was normal [Normal Insight/Judgement] : insight and judgment were intact [de-identified] : thyroid fullness

## 2023-01-23 NOTE — HISTORY OF PRESENT ILLNESS
[FreeTextEntry1] : annual wellness visit  [de-identified] : 36 y/o F presents for annual wellness visit. She is experiencing post partum fatigue, thyroid fullness, and new onset insomnia. Suspected thyroiditis, checking thyroid levels and anemia panel. She feels otherwise well and reports no other acute complaints or concerns. ROS as documented below.\par \par \par I Kaila Bancroft am scribing for and in the presence of Dr. Christopher Dorman, the following sections: HISTORY OF PRESENT ILLNESS, PAST MEDICAL/FAMILY/SOCIAL HISTORY, REVIEW OF SYSTEMS, PHYSICAL EXAM; DISPOSITION.\par \par I personally performed the services described in the documentation, reviewed the documentation recorded by the scribe in my presence and it accurately and completely records my words and actions.\par

## 2023-02-02 NOTE — DISCHARGE NOTE OB - THE PATIENT HAS
[NS_DeliveryAttending1_OBGYN_ALL_OB_FT:[id number]],[NS_DeliveryAssist1_OBGYN_ALL_OB_FT:[id number]=]
no difficulties

## 2023-08-03 NOTE — PATIENT PROFILE OB - PRO BLOOD TYPE INFANT
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

## 2024-03-12 ENCOUNTER — APPOINTMENT (OUTPATIENT)
Dept: ANTEPARTUM | Facility: CLINIC | Age: 39
End: 2024-03-12

## 2024-05-07 ENCOUNTER — ASOB RESULT (OUTPATIENT)
Age: 39
End: 2024-05-07

## 2024-05-07 ENCOUNTER — APPOINTMENT (OUTPATIENT)
Dept: ANTEPARTUM | Facility: CLINIC | Age: 39
End: 2024-05-07
Payer: COMMERCIAL

## 2024-05-07 PROCEDURE — 76811 OB US DETAILED SNGL FETUS: CPT

## 2024-07-22 ENCOUNTER — APPOINTMENT (OUTPATIENT)
Dept: ANTEPARTUM | Facility: CLINIC | Age: 39
End: 2024-07-22

## 2024-07-24 ENCOUNTER — APPOINTMENT (OUTPATIENT)
Dept: CARDIOLOGY | Facility: CLINIC | Age: 39
End: 2024-07-24
Payer: COMMERCIAL

## 2024-07-24 ENCOUNTER — NON-APPOINTMENT (OUTPATIENT)
Age: 39
End: 2024-07-24

## 2024-07-24 VITALS
HEIGHT: 64 IN | HEART RATE: 77 BPM | OXYGEN SATURATION: 98 % | WEIGHT: 173 LBS | BODY MASS INDEX: 29.53 KG/M2 | SYSTOLIC BLOOD PRESSURE: 110 MMHG | DIASTOLIC BLOOD PRESSURE: 70 MMHG | TEMPERATURE: 98.7 F

## 2024-07-24 DIAGNOSIS — R06.00 DYSPNEA, UNSPECIFIED: ICD-10-CM

## 2024-07-24 DIAGNOSIS — R00.2 PALPITATIONS: ICD-10-CM

## 2024-07-24 DIAGNOSIS — Z3A.33 33 WEEKS GESTATION OF PREGNANCY: ICD-10-CM

## 2024-07-24 DIAGNOSIS — R03.0 ELEVATED BLOOD-PRESSURE READING, W/OUT DIAGNOSIS OF HYPERTENSION: ICD-10-CM

## 2024-07-24 DIAGNOSIS — R01.1 CARDIAC MURMUR, UNSPECIFIED: ICD-10-CM

## 2024-07-24 PROCEDURE — 93000 ELECTROCARDIOGRAM COMPLETE: CPT

## 2024-07-24 PROCEDURE — 99204 OFFICE O/P NEW MOD 45 MIN: CPT | Mod: 25

## 2024-07-24 PROCEDURE — 93306 TTE W/DOPPLER COMPLETE: CPT

## 2024-07-25 LAB
T3FREE SERPL-MCNC: 2.59 PG/ML
T4 FREE SERPL-MCNC: 0.8 NG/DL
TSH SERPL-ACNC: 1.21 UIU/ML

## 2024-07-25 NOTE — HISTORY OF PRESENT ILLNESS
[FreeTextEntry1] : 40 yo female, currently 32 weeks pregnant, referred by OB Dr. Nava. She is c/o palpitations and SOB that have worsened over the last week.  Pt states she has experienced sob and palpitations in past pregnancies but feels it is worse this time.  She also endorses occasional dizziness and blurry vision.  Pt was in [facility] this week after experiencing elevated BP on friends BP monitor at 130/110. Workup negative. H/H 12.4/37.4, WBC 11.49

## 2024-07-25 NOTE — HISTORY OF PRESENT ILLNESS
[FreeTextEntry1] : 38 yo female, currently 32 weeks pregnant, referred by OB Dr. Nava. She is c/o palpitations and SOB that have worsened over the last week.  Pt states she has experienced sob and palpitations in past pregnancies but feels it is worse this time.  She also endorses occasional dizziness and blurry vision.  Pt was in [facility] this week after experiencing elevated BP on friends BP monitor at 130/110. Workup negative. H/H 12.4/37.4, WBC 11.49

## 2024-08-02 ENCOUNTER — NON-APPOINTMENT (OUTPATIENT)
Age: 39
End: 2024-08-02

## 2024-09-05 ENCOUNTER — NON-APPOINTMENT (OUTPATIENT)
Age: 39
End: 2024-09-05

## 2024-09-23 ENCOUNTER — TRANSCRIPTION ENCOUNTER (OUTPATIENT)
Age: 39
End: 2024-09-23

## 2024-09-23 ENCOUNTER — INPATIENT (INPATIENT)
Facility: HOSPITAL | Age: 39
LOS: 1 days | Discharge: HOME | End: 2024-09-25
Attending: SPECIALIST | Admitting: SPECIALIST

## 2024-09-23 VITALS — DIASTOLIC BLOOD PRESSURE: 63 MMHG | SYSTOLIC BLOOD PRESSURE: 133 MMHG | HEART RATE: 82 BPM

## 2024-09-23 DIAGNOSIS — Z98.89 OTHER SPECIFIED POSTPROCEDURAL STATES: Chronic | ICD-10-CM

## 2024-09-23 LAB
BASOPHILS # BLD AUTO: 0.04 K/UL — SIGNIFICANT CHANGE UP (ref 0–0.2)
BASOPHILS NFR BLD AUTO: 0.4 % — SIGNIFICANT CHANGE UP (ref 0–2)
BLD GP AB SCN SERPL QL: NEGATIVE — SIGNIFICANT CHANGE UP
EOSINOPHIL # BLD AUTO: 0.06 K/UL — SIGNIFICANT CHANGE UP (ref 0–0.5)
EOSINOPHIL NFR BLD AUTO: 0.6 % — SIGNIFICANT CHANGE UP (ref 0–6)
HCT VFR BLD CALC: 40.4 % — SIGNIFICANT CHANGE UP (ref 34.5–45)
HGB BLD-MCNC: 13.8 G/DL — SIGNIFICANT CHANGE UP (ref 11.5–15.5)
IANC: 7.13 K/UL — SIGNIFICANT CHANGE UP (ref 1.8–7.4)
IMM GRANULOCYTES NFR BLD AUTO: 0.5 % — SIGNIFICANT CHANGE UP (ref 0–0.9)
LYMPHOCYTES # BLD AUTO: 2.53 K/UL — SIGNIFICANT CHANGE UP (ref 1–3.3)
LYMPHOCYTES # BLD AUTO: 23.9 % — SIGNIFICANT CHANGE UP (ref 13–44)
MCHC RBC-ENTMCNC: 30.1 PG — SIGNIFICANT CHANGE UP (ref 27–34)
MCHC RBC-ENTMCNC: 34.2 GM/DL — SIGNIFICANT CHANGE UP (ref 32–36)
MCV RBC AUTO: 88 FL — SIGNIFICANT CHANGE UP (ref 80–100)
MONOCYTES # BLD AUTO: 0.76 K/UL — SIGNIFICANT CHANGE UP (ref 0–0.9)
MONOCYTES NFR BLD AUTO: 7.2 % — SIGNIFICANT CHANGE UP (ref 2–14)
NEUTROPHILS # BLD AUTO: 7.13 K/UL — SIGNIFICANT CHANGE UP (ref 1.8–7.4)
NEUTROPHILS NFR BLD AUTO: 67.4 % — SIGNIFICANT CHANGE UP (ref 43–77)
NRBC # BLD: 0 /100 WBCS — SIGNIFICANT CHANGE UP (ref 0–0)
NRBC # FLD: 0 K/UL — SIGNIFICANT CHANGE UP (ref 0–0)
PLATELET # BLD AUTO: 177 K/UL — SIGNIFICANT CHANGE UP (ref 150–400)
RBC # BLD: 4.59 M/UL — SIGNIFICANT CHANGE UP (ref 3.8–5.2)
RBC # FLD: 14 % — SIGNIFICANT CHANGE UP (ref 10.3–14.5)
RH IG SCN BLD-IMP: POSITIVE — SIGNIFICANT CHANGE UP
RH IG SCN BLD-IMP: POSITIVE — SIGNIFICANT CHANGE UP
WBC # BLD: 10.57 K/UL — HIGH (ref 3.8–10.5)
WBC # FLD AUTO: 10.57 K/UL — HIGH (ref 3.8–10.5)

## 2024-09-23 RX ORDER — SODIUM CHLORIDE IRRIG SOLUTION 0.9 %
1000 SOLUTION, IRRIGATION IRRIGATION ONCE
Refills: 0 | Status: DISCONTINUED | OUTPATIENT
Start: 2024-09-23 | End: 2024-09-23

## 2024-09-23 RX ORDER — ACETAMINOPHEN 325 MG
975 TABLET ORAL
Refills: 0 | Status: DISCONTINUED | OUTPATIENT
Start: 2024-09-23 | End: 2024-09-25

## 2024-09-23 RX ORDER — OXYCODONE HYDROCHLORIDE 30 MG/1
5 TABLET, FILM COATED, EXTENDED RELEASE ORAL
Refills: 0 | Status: DISCONTINUED | OUTPATIENT
Start: 2024-09-23 | End: 2024-09-25

## 2024-09-23 RX ORDER — ANTI-ITCH CREAM 1 G/100G
1 OINTMENT TOPICAL EVERY 6 HOURS
Refills: 0 | Status: DISCONTINUED | OUTPATIENT
Start: 2024-09-23 | End: 2024-09-25

## 2024-09-23 RX ORDER — SODIUM CHLORIDE IRRIG SOLUTION 0.9 %
500 SOLUTION, IRRIGATION IRRIGATION ONCE
Refills: 0 | Status: DISCONTINUED | OUTPATIENT
Start: 2024-09-23 | End: 2024-09-23

## 2024-09-23 RX ORDER — ACETAMINOPHEN 325 MG
975 TABLET ORAL
Refills: 0 | Status: DISCONTINUED | OUTPATIENT
Start: 2024-09-23 | End: 2024-09-24

## 2024-09-23 RX ORDER — OXYTOCIN/RINGER'S LACTATE 20/500ML
6 PLASTIC BAG, INJECTION (ML) INTRAVENOUS
Qty: 30 | Refills: 0 | Status: DISCONTINUED | OUTPATIENT
Start: 2024-09-23 | End: 2024-09-23

## 2024-09-23 RX ORDER — MAGNESIUM HYDROXIDE 400 MG/5ML
30 SUSPENSION, ORAL (FINAL DOSE FORM) ORAL
Refills: 0 | Status: DISCONTINUED | OUTPATIENT
Start: 2024-09-23 | End: 2024-09-25

## 2024-09-23 RX ORDER — DIPHENHYDRAMINE HCL 12.5MG/5ML
25 LIQUID (ML) ORAL EVERY 6 HOURS
Refills: 0 | Status: DISCONTINUED | OUTPATIENT
Start: 2024-09-23 | End: 2024-09-25

## 2024-09-23 RX ORDER — KETOROLAC TROMETHAMINE 10 MG/1
30 TABLET, FILM COATED ORAL ONCE
Refills: 0 | Status: DISCONTINUED | OUTPATIENT
Start: 2024-09-23 | End: 2024-09-23

## 2024-09-23 RX ORDER — SOAP/LANOLIN
1 BAR TOPICAL EVERY 4 HOURS
Refills: 0 | Status: DISCONTINUED | OUTPATIENT
Start: 2024-09-23 | End: 2024-09-25

## 2024-09-23 RX ORDER — PRAMOXINE HYDROCHLORIDE 10 MG/ML
1 LOTION TOPICAL EVERY 4 HOURS
Refills: 0 | Status: DISCONTINUED | OUTPATIENT
Start: 2024-09-23 | End: 2024-09-25

## 2024-09-23 RX ORDER — DIBUCAINE 1 %
1 OINTMENT (GRAM) TOPICAL EVERY 6 HOURS
Refills: 0 | Status: DISCONTINUED | OUTPATIENT
Start: 2024-09-23 | End: 2024-09-25

## 2024-09-23 RX ORDER — OXYTOCIN/RINGER'S LACTATE 20/500ML
167 PLASTIC BAG, INJECTION (ML) INTRAVENOUS
Qty: 30 | Refills: 0 | Status: COMPLETED | OUTPATIENT
Start: 2024-09-23 | End: 2024-09-23

## 2024-09-23 RX ORDER — OXYTOCIN/RINGER'S LACTATE 20/500ML
167 PLASTIC BAG, INJECTION (ML) INTRAVENOUS
Qty: 30 | Refills: 0 | Status: DISCONTINUED | OUTPATIENT
Start: 2024-09-23 | End: 2024-09-24

## 2024-09-23 RX ORDER — OXYCODONE HYDROCHLORIDE 30 MG/1
5 TABLET, FILM COATED, EXTENDED RELEASE ORAL ONCE
Refills: 0 | Status: DISCONTINUED | OUTPATIENT
Start: 2024-09-23 | End: 2024-09-25

## 2024-09-23 RX ORDER — SODIUM CHLORIDE IRRIG SOLUTION 0.9 %
1000 SOLUTION, IRRIGATION IRRIGATION
Refills: 0 | Status: DISCONTINUED | OUTPATIENT
Start: 2024-09-23 | End: 2024-09-23

## 2024-09-23 RX ORDER — SODIUM CHLORIDE 0.9 % (FLUSH) 0.9 %
3 SYRINGE (ML) INJECTION EVERY 8 HOURS
Refills: 0 | Status: DISCONTINUED | OUTPATIENT
Start: 2024-09-23 | End: 2024-09-25

## 2024-09-23 RX ORDER — PRENATAL VIT,CAL 76/IRON/FOLIC 29 MG-1 MG
1 TABLET ORAL DAILY
Refills: 0 | Status: DISCONTINUED | OUTPATIENT
Start: 2024-09-23 | End: 2024-09-25

## 2024-09-23 RX ORDER — CHLORHEXIDINE GLUCONATE ORAL RINSE 1.2 MG/ML
1 SOLUTION DENTAL DAILY
Refills: 0 | Status: DISCONTINUED | OUTPATIENT
Start: 2024-09-23 | End: 2024-09-23

## 2024-09-23 RX ORDER — TETANUS TOXOID, REDUCED DIPHTHERIA TOXOID AND ACELLULAR PERTUSSIS VACCINE, ADSORBED 5; 2.5; 8; 8; 2.5 [IU]/.5ML; [IU]/.5ML; UG/.5ML; UG/.5ML; UG/.5ML
0.5 SUSPENSION INTRAMUSCULAR ONCE
Refills: 0 | Status: DISCONTINUED | OUTPATIENT
Start: 2024-09-23 | End: 2024-09-25

## 2024-09-23 RX ADMIN — KETOROLAC TROMETHAMINE 30 MILLIGRAM(S): 10 TABLET, FILM COATED ORAL at 23:18

## 2024-09-23 RX ADMIN — Medication 6 MILLIUNIT(S)/MIN: at 15:00

## 2024-09-23 RX ADMIN — CHLORHEXIDINE GLUCONATE ORAL RINSE 1 APPLICATION(S): 1.2 SOLUTION DENTAL at 15:01

## 2024-09-23 RX ADMIN — Medication 125 MILLILITER(S): at 15:00

## 2024-09-23 RX ADMIN — Medication 167 MILLIUNIT(S)/MIN: at 22:06

## 2024-09-23 RX ADMIN — Medication 3 MILLILITER(S): at 23:22

## 2024-09-23 NOTE — OB PROVIDER LABOR PROGRESS NOTE - NS_SUBJECTIVE/OBJECTIVE_OBGYN_ALL_OB_FT
Patient with complaints of rectal pressure and urge to push.  VS  T(C): 36.8 (09-23-24 @ 19:00)  HR: 152 (09-23-24 @ 20:32)  BP: 119/66 (09-23-24 @ 20:06)  RR: 18 (09-23-24 @ 19:00)  SpO2: 79% (09-23-24 @ 20:36)

## 2024-09-23 NOTE — DISCHARGE NOTE OB - MATERIALS PROVIDED
[facility] [place] Screening Program/[place]  Immunization Record/Breastfeeding Log/Guide to Postpartum Care/[facility] Hearing Screen Program/Shaken Baby Prevention Handout/Birth Certificate Instructions

## 2024-09-23 NOTE — OB PROVIDER H&P - NS_FETALMONCTXRES_OBGYN_ALL_OB
PRINCIPAL PROCEDURE  Procedure: Coronary stent placement  Findings and Treatment: -You had a stent placed to your left anterior descending artery (LAD) 70% blockage with Dr. Anderson on 9/4/24.  -Please continue to monitor your right wrist when you go home. If you develop any bleeding, lay down where you are and apply direct firm pressure until the bleeding stops. If the bleeding is excessive, please call 911.   -If heavy bleeding or large lumps form, hold pressure at the spot and come to the Emergency Room.  -No submerging the arm in water for 48 hours. This includes hot tubs, swimming pools and jacuzzis.  You may start showering today. Prior to showering, remove dressing from right wrist. Shower with warm water and soap. Pat dry with towel. You may place a bandaid over the site. change the bandaid every day.   -Restricted use with no heavy lifting of affected arm for 5 days. No heavy lifting greater than 5 lbs.  -You may walk indoors/ outdoors as tolerated. No strenuous exercise, gym, sports or heavy lifting x5 days.  -Please return to nearest ED if you develop any fever, chills, drainage from the incision site, or any change of temperature, color, sensation of the affected extremity.  -Call your doctor for any bleeding, swelling, loss of sensation in the hand or fingers, or fingers turning blue.  -Please return to nearest ED if you develop any chest pain, chest pressure, shortness of breath, jaw pain, pain radiating down the arm, palpitations, dizziness, palpitations, abdominal complaints including abdominal pain, nausea and vomiting.   -Please follow up with your cardiologist in 1-2 weeks     Relaxed

## 2024-09-23 NOTE — OB RN DELIVERY SUMMARY - NS_SEPSISRSKCALC_OBGYN_ALL_OB_FT
EOS calculated successfully. EOS Risk Factor: 0.5/1000 live births ([facility] national incidence); GA=40w6d; Temp=98.24; ROM=4.417; GBS='Negative'; Antibiotics='No antibiotics or any antibiotics < 2 hrs prior to birth'

## 2024-09-23 NOTE — DISCHARGE NOTE OB - MEDICATION SUMMARY - MEDICATIONS TO TAKE
I will START or STAY ON the medications listed below when I get home from the hospital:    ibuprofen 600 mg oral tablet  -- 1 tab(s) by mouth every 6 hours  -- Indication: For Normal vaginal delivery    acetaminophen 325 mg oral tablet  -- 3 tab(s) by mouth 4 times a day, As Needed - for moderate pain  -- Indication: For Normal vaginal delivery    Prenatal 1 oral capsule  -- orally  -- Indication: For Normal vaginal delivery

## 2024-09-23 NOTE — DISCHARGE NOTE OB - PATIENT PORTAL LINK FT
You can access the FollowMyHealth Patient Portal offered by [facility] by registering at the following website: [email or web address]. By joining [facility]’s FollowMyHealth portal, you will also be able to view your health information using other applications (apps) compatible with our system.

## 2024-09-23 NOTE — OB PROVIDER H&P - NSOBVTERISKREFER_OBGYN_ALL_OB
Lethargic, arousable to verbal stimulus
Refer to the Assessment tab to view/cancel completed assessment.

## 2024-09-23 NOTE — OB PROVIDER H&P - NSINFANTSEX1_OBGYN_ALL_OB
[FreeTextEntry1] : Past Psyc Hx:  Pt started treatment for anxiety/depression at age 15.  She struggled with body dysmorphic disorder. Was on several medications but never been adherent to it. Was on/off medications. 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 She started to self medicate with alcohol and started to abuse it. Her wake up call was when she went to work intoxicated and was pulled aside. Pt started Rehab in 2021 for AUD, for a month. Continued to see Dr Shah and is currently on Wellbutrin, Strattera and Trazodone. She is looking  to switch providers because of change of insurance. \par  \par  Past Psych Hospitalization: \par  \par  Psychotherapy:   Seeing [employer]  every week at [facility]. \par  \par  Suicide attempt:  Denies \par  \par  SIB:  Denies\par  \par   \par  \par  Allergies:  PCN\par  \par   \par  \par  Major Medical Problems:   Migraine\par  \par  Prescription medications:  birth control and her psychotropic medications\par  \par  OTC medications: Advil, prilosec\par  \par  TBI: None \par  \par  Seizures: None \par  \par  Migraine HA:  Last week, goes to chiropractor \par  \par   \par  \par  Surgery:  Bariatric surgery at age 18, revision done in , bowel obstruction surgery in \par  \par   \par  \par  Substance Use History: \par  \par   \par  \par  Nicotine: Vaping occasionally \par  \par  Alcohol: Sober for the last 10 months.  Started drinking in senior year of college. Her drinking got worse since the pandemic, would drink 2 bottles of wine, mix drinks.  "I was a black out drinker." \par  \par  CAGE Questionnaire : negative \par  \par  Illicit drugs: MJ last smoked was May of last year\par  \par  Withdrawal: n/a \par  \par  Hospitalizations: n/a \par  \par  Detox/Rehab tx:  Went to [facility] rehab in 2021 for a month\par  \par   \par  \par  Developmental History \par  \par   \par  \par  Pre/ problems: Unremarkable \par  \par  Developmental milestones: Unremarkable \par  \par   \par  \par   \par  \par  OBGYN Hx: \par  \par  Menstrual cycle: Menarche:  10 y/o , LMP: current  \par  \par   \par  \par  Pregnancies/Miscarriage:  None\par  \par   \par  \par  Menopause: n/a\par  \par   
Male

## 2024-09-23 NOTE — OB PROVIDER DELIVERY SUMMARY - NSPROVIDERDELIVERYNOTE_OBGYN_ALL_OB_FT
Ob attending    Patient fully dilated and pushing delivered a liveborn female infant over intact perineum from OA position- spontaneous cry.  Cord clamped and cut after 60 sec delay.  Cord gases sent.  Placneta delivered spontaneously and intact. No cervical, lateral wall, perineal lacerations.  Rectum intact.  QBL 64 cc. Cytotec 400mcg buccal and 600mcg WI prophylactically.  Baby to GEO Arvizu MD

## 2024-09-23 NOTE — OB PROVIDER H&P - HISTORY OF PRESENT ILLNESS
40 y/o  at 40.6 weeks gestation admitted for eIOL. Denies headache, fever, visual changes, nausea/vomiting, shortness of breath, epigastric pain, abdominal pain, back pain, contractions, cramping, leaking of fluid, vaginal bleeding. Endorses good fetal movement.    NKDA  5'4", 175lbs  GBS neg 24    Pt had h/o heart palpitations and shortness of breath during this pregnancy, followed up with cardiology, no findings. Had TTE 24 with an EF of 67%

## 2024-09-23 NOTE — DISCHARGE NOTE OB - NS MD DC FALL RISK RISK
For information on Fall & Injury Prevention, visit: [email or web address] OR  [email or web address] OR  https://www.cdc.gov/steadi/patient.html

## 2024-09-23 NOTE — OB PROVIDER H&P - NSLOWPPHRISK_OBGYN_A_OB
No previous uterine incision/[place] Pregnancy/No known bleeding disorder/No history of postpartum hemorrhage

## 2024-09-23 NOTE — OB PROVIDER H&P - NS_ADMITREASONOTHER_OBGYN_ALL_OB_FT
Acitretin Counseling:  I discussed with the patient the risks of acitretin including but not limited to hair loss, dry lips/skin/eyes, liver damage, hyperlipidemia, depression/suicidal ideation, photosensitivity.  Serious rare side effects can include but are not limited to pancreatitis, pseudotumor cerebri, bony changes, clot formation/stroke/heart attack.  Patient understands that alcohol is contraindicated since it can result in liver toxicity and significantly prolong the elimination of the drug by many years. elective IOL

## 2024-09-23 NOTE — OB PROVIDER LABOR PROGRESS NOTE - NSVAGINALEXAM_OBGYN_ALL_OB_DT
Refill requested from patients pharmacy for Mometasone 0.1% ointment. Patient was last seen 11.16.2016 and has a follow up scheduled for 04.19.2017. Pended orders to Dr. Shelton to approve or deny the request.    Pari Stoner, [facility]    
23-Sep-2024 20:30

## 2024-09-23 NOTE — OB PROVIDER LABOR PROGRESS NOTE - ASSESSMENT
Patient fully dilated with urge to push  MD Suarez called to bedside for anticipated vaginally delivery  Patient to start pushing  Cont EFM/TOCO    CHANDANA Hernandez NP

## 2024-09-23 NOTE — OB RN PATIENT PROFILE - NSICDXPASTMEDICALHX_GEN_ALL_CORE_FT
PAST MEDICAL HISTORY:  2019 novel coronavirus disease (COVID-19)     Missed      Normal vaginal delivery x 5   2007  2008  2010  4# at 37 week  6#13  6#13  6#10     PAST MEDICAL HISTORY:  2019 novel coronavirus disease (COVID-19)     Missed      Normal vaginal delivery x 5   2007  2008  2010  4# at 37 week  6#13  6#13  6#10    Normal vaginal delivery

## 2024-09-23 NOTE — DISCHARGE NOTE OB - CARE PROVIDER_API CALL
Taz Nava.  Obstetrics and Gynecology  [address] [place] [address]  Phone: [phone]  Fax: [phone]  Follow Up Time:

## 2024-09-23 NOTE — OB RN DELIVERY SUMMARY - NSSELHIDDEN_OBGYN_ALL_OB_FT
[NS_DeliveryAttending1_OBGYN_ALL_OB_FT:[id number]],[NS_DeliveryRN_OBGYN_ALL_OB_FT:[id number]=]

## 2024-09-23 NOTE — OB PROVIDER H&P - INTERNATIONAL TRAVEL
Subjective:  
  
Justin Yanez is a 71 y.o. female who presents today for follow up of her diabetes mellitus type 2- diet controlled, hypertension and hyperlipidemia. No new concerns at this time. Has been busy traveling all summer. She denies polyuria, polydipsia, nocturia, nausea/vomiting, bowel changes, chest pain, syncope, dyspnea or lightheadedness. Has been doing more walking. Patient Active Problem List  
Diagnosis Code  Essential hypertension I10  
 History of screening mammography Z92.89  
 Pap smear for cervical cancer screening Z12.4  Screen for colon cancer Z12.11  
 [facility] E11.9  History of bone density study Z92.89  
 Hyperlipidemia E78.5  Diabetic eye exam ([facility] Z01.00, E11.9  Advance directive in chart Z78.9 Current Outpatient Prescriptions Medication Sig Dispense Refill  losartan-hydroCHLOROthiazide (HYZAAR) 100-12.5 mg per tablet TAKE 1 TABLET BY MOUTH EVERY DAY 90 Tab 1  
 atorvastatin (LIPITOR) 10 mg tablet TAKE 1 TABLET BY MOUTH EVERY DAY 90 Tab 1  
 CHOLECALCIFEROL, VITAMIN D3, (VITAMIN D3 PO) Take 2,000 Units by mouth daily.  CYANOCOBALAMIN, VITAMIN B-12, (VITAMIN B-12 PO) Take  by mouth. Review of Systems Pertinent items are noted in HPI. Objective: Wt Readings from Last 3 Encounters:  
08/29/18 182 lb (82.6 kg) 03/21/18 180 lb (81.6 kg) 10/20/17 180 lb 6.4 oz (81.8 kg) Temp Readings from Last 3 Encounters:  
08/29/18 98 °F (36.7 °C) (Oral) 03/21/18 97.4 °F (36.3 °C) (Oral) 10/20/17 97.1 °F (36.2 °C) (Oral) BP Readings from Last 3 Encounters:  
08/29/18 138/86  
03/21/18 120/78  
10/20/17 128/88 Pulse Readings from Last 3 Encounters:  
08/29/18 (!) 52  
03/21/18 64  
10/20/17 86 Visit Vitals  /86 (BP 1 Location: Left arm, BP Patient Position: Sitting)  Pulse (!) 52  Temp 98 °F (36.7 °C) (Oral)  Resp 14  
 Ht 5' 5\" (1.651 m)  Wt 182 lb (82.6 kg)  SpO2 95%  BMI 30.29 kg/m2 General appearance: alert, cooperative, no distress, appears stated age Head: Normocephalic, without obvious abnormality, atraumatic Neck: supple, symmetrical, trachea midline, no adenopathy, no carotid bruit and no JVD Lungs: clear to auscultation bilaterally Heart: regular rate and rhythm, S1, S2 normal, no murmur, click, rub or gallop Abdomen: soft, non-tender. Bowel sounds normal. No masses,  no organomegaly Extremities: extremities normal, atraumatic, no cyanosis or edema Pulses: 2+ and symmetric Assessment/Plan: 1. Diabetes mellitus type 2, diet-controlled ([facility] 
-has been compliant with diabetes mellitus diet. -working in increasing her exercise. Has lost inches, but not weight.  
 
- METABOLIC PANEL, COMPREHENSIVE 
- HEMOGLOBIN A1C WITH EAG 
- MICROALBUMIN, UR, RAND W/ MICROALB/CREAT RATIO 2. Benign hypertension 
-I evaluated and recommended to continue current doses of medications.  
 
- METABOLIC PANEL, COMPREHENSIVE 3. Mixed hyperlipidemia 
-last fasting lipid panel done 3/2018 controlled. - METABOLIC PANEL, COMPREHENSIVE 4. Encounter for long-term (current) use of medications - METABOLIC PANEL, COMPREHENSIVE 
- HEMOGLOBIN A1C WITH EAG 
- MICROALBUMIN, UR, RAND W/ MICROALB/CREAT RATIO 5. Health Care Maintenance Patient refuses shingles vaccine. She is aware of the importance of this vaccine. Follow-up Disposition:  
 
Follow up in 4 months Return if symptoms worsen or fail to improve. Advised patient to call back or return to office if symptoms worsen/change/persist.  
 
Discussed expected course/resolution/complications of diagnosis in detail with patient. Medication risks/benefits/costs/interactions/alternatives discussed with patient. Patient was given an after visit summary which includes diagnoses, current medications, & vitals. Patient expressed understanding with the diagnosis and plan. No

## 2024-09-23 NOTE — OB RN PATIENT PROFILE - AS HEIGHT TYPE
[facility] [place]  [address] [place] [address] [address]  [facility] [address]  Phone: [phone]  Fax: [phone]    November 25, 2022        Carl Schaeffer  [address] [facility] [address]          To whom it may concern:    RE: Carl Schaeffer    Patient was seen and treated today at our clinic. Off work 11/26/22    Please contact me for questions or concerns.      Sincerely,        No name on file   stated

## 2024-09-23 NOTE — OB PROVIDER H&P - NSHPPHYSICALEXAM_GEN_ALL_CORE
A&O x3  NAD  FHT: NST reactive  TOCO: no contractions  abdomen: gravid, soft, nontender  SVE: 2/50/-3  TAUS: cephalic presentation  EFW: 2722g    Vital Signs Last 24 Hrs  T(C): 36.8 (23 Sep 2024 13:27), Max: 36.8 (23 Sep 2024 13:27)  T(F): 98.2 (23 Sep 2024 13:27), Max: 98.2 (23 Sep 2024 13:27)  HR: 92 (23 Sep 2024 14:01) (82 - 95)  BP: 133/63 (23 Sep 2024 13:27) (133/63 - 133/63)  BP(mean): --  RR: 18 (23 Sep 2024 13:27) (18 - 18)  SpO2: 100% (23 Sep 2024 14:01) (99% - 100%)    Parameters below as of 23 Sep 2024 13:27  Patient On (Oxygen Delivery Method): room air

## 2024-09-23 NOTE — CHART NOTE - NSCHARTNOTEFT_GEN_A_CORE
Ob attending    pt comfortbale  ve 1-2/50/-2  fht 140 moderate variability accels no decels  toco q 2 min  a/p cat 1 fht  Arom clear  continue pitocin   TXA at delivery    Nolberto STARK
Ob attending    pt with occasional pressure  ve 4/100/-1  fht 135 moderate variability early decelerations  toco q 2-3 min  a/p ca t 1 fht  anticipate     Nolberto STARK
ob attending    pt with ctx  ve 2-3/80/-1  fht 130 moderate variability accels early decelerations  toco q 2-3 min  a/p cat 1 fht  cx change noted    Nolberto STARK

## 2024-09-23 NOTE — OB PROVIDER H&P - PROBLEM SELECTOR PLAN 1
Risks, benefits, alternatives and possible complications have been discussed in detail with the patient in her native language. Pre-admission, admission, and post admission procedures and expectations were discussed in detail. All questions answered, all appropriate hospital consents were signed.  Anticipate normal vaginal delivery.     Informed Consent was obtained. The following was discussed:     Induction/Augmentation of Labor: Use of medication and/or cook balloon to begin or enhance labor  Obstetrical management including internal fetal/contraction monitoring  Normal vaginal delivery  Possible  Section: (surgical cut in the abdomen in order to deliver the baby)      - Admit to L&D  - cEFM  - Clear diet, IV fluids  - Consent signed  - Standard labs (T&S, CBC, RPR)  - Epidural PRN  - Induction/augmentation agent: pitocin  - d/w Dr. Daniela Patel [facility]

## 2024-09-24 LAB
HCT VFR BLD CALC: 33.5 % — LOW (ref 34.5–45)
HGB BLD-MCNC: 11.8 G/DL — SIGNIFICANT CHANGE UP (ref 11.5–15.5)
T PALLIDUM AB TITR SER: NEGATIVE — SIGNIFICANT CHANGE UP

## 2024-09-24 RX ADMIN — Medication 975 MILLIGRAM(S): at 21:31

## 2024-09-24 RX ADMIN — Medication 975 MILLIGRAM(S): at 01:32

## 2024-09-24 RX ADMIN — Medication 975 MILLIGRAM(S): at 08:21

## 2024-09-24 RX ADMIN — Medication 975 MILLIGRAM(S): at 15:30

## 2024-09-24 RX ADMIN — Medication 600 MILLIGRAM(S): at 18:43

## 2024-09-24 RX ADMIN — Medication 600 MILLIGRAM(S): at 18:08

## 2024-09-24 RX ADMIN — Medication 975 MILLIGRAM(S): at 02:30

## 2024-09-24 RX ADMIN — Medication 600 MILLIGRAM(S): at 05:44

## 2024-09-24 RX ADMIN — Medication 975 MILLIGRAM(S): at 14:55

## 2024-09-24 RX ADMIN — Medication 3 MILLILITER(S): at 14:00

## 2024-09-24 RX ADMIN — Medication 600 MILLIGRAM(S): at 11:35

## 2024-09-24 RX ADMIN — Medication 3 MILLILITER(S): at 04:22

## 2024-09-24 RX ADMIN — Medication 975 MILLIGRAM(S): at 21:01

## 2024-09-24 RX ADMIN — Medication 1 TABLET(S): at 11:36

## 2024-09-24 RX ADMIN — Medication 975 MILLIGRAM(S): at 09:00

## 2024-09-24 RX ADMIN — Medication 600 MILLIGRAM(S): at 06:15

## 2024-09-24 RX ADMIN — Medication 600 MILLIGRAM(S): at 12:15

## 2024-09-24 NOTE — PROGRESS NOTE ADULT - SUBJECTIVE AND OBJECTIVE BOX
S: Patient doing well. Minimal lochia. Pain controlled.    O: Vital Signs Last 24 Hrs  T(C): 36.4 (24 Sep 2024 05:59), Max: 36.8 (23 Sep 2024 13:27)  T(F): 97.5 (24 Sep 2024 05:59), Max: 98.24 (23 Sep 2024 15:04)  HR: 61 (24 Sep 2024 05:59) (61 - 152)  BP: 112/53 (24 Sep 2024 05:59) (72/43 - 152/108)  BP(mean): --  RR: 18 (24 Sep 2024 05:59) (18 - 18)  SpO2: 99% (24 Sep 2024 05:59) (79% - 100%)    Parameters below as of 24 Sep 2024 05:59  Patient On (Oxygen Delivery Method): room air        Gen: NAD  Abd: soft, NT, ND, fundus firm below umbilicus  Lochia: moderate  Ext: no tenderness    Labs:                        13.8   10.57 )-----------( 177      ( 23 Sep 2024 13:25 )             40.4   rh pos    A: 39y PPD#1 s/p  doing well.    Plan:  continue care  discharge instructions given

## 2024-09-25 VITALS
OXYGEN SATURATION: 99 % | RESPIRATION RATE: 18 BRPM | DIASTOLIC BLOOD PRESSURE: 77 MMHG | TEMPERATURE: 98 F | SYSTOLIC BLOOD PRESSURE: 130 MMHG | HEART RATE: 77 BPM

## 2024-09-25 RX ADMIN — Medication 975 MILLIGRAM(S): at 08:42

## 2024-09-25 RX ADMIN — Medication 600 MILLIGRAM(S): at 05:40

## 2024-09-25 RX ADMIN — Medication 975 MILLIGRAM(S): at 03:30

## 2024-09-25 RX ADMIN — Medication 975 MILLIGRAM(S): at 02:59

## 2024-09-25 RX ADMIN — Medication 600 MILLIGRAM(S): at 00:13

## 2024-09-25 RX ADMIN — Medication 600 MILLIGRAM(S): at 00:43

## 2024-09-25 RX ADMIN — Medication 600 MILLIGRAM(S): at 06:10

## 2024-09-25 RX ADMIN — Medication 975 MILLIGRAM(S): at 09:15

## 2025-01-23 ENCOUNTER — NON-APPOINTMENT (OUTPATIENT)
Age: 40
End: 2025-01-23

## 2025-01-23 DIAGNOSIS — Z98.890 OTHER SPECIFIED POSTPROCEDURAL STATES: ICD-10-CM

## 2025-01-24 ENCOUNTER — ASOB RESULT (OUTPATIENT)
Age: 40
End: 2025-01-24

## 2025-01-24 ENCOUNTER — APPOINTMENT (OUTPATIENT)
Facility: CLINIC | Age: 40
End: 2025-01-24

## 2025-01-24 VITALS — DIASTOLIC BLOOD PRESSURE: 81 MMHG | SYSTOLIC BLOOD PRESSURE: 115 MMHG

## 2025-01-24 LAB — HCG UR QL: NEGATIVE

## 2025-01-24 PROCEDURE — 99401 PREV MED CNSL INDIV APPRX 15: CPT

## 2025-01-24 PROCEDURE — 58300 INSERT INTRAUTERINE DEVICE: CPT

## 2025-01-24 PROCEDURE — 76856 US EXAM PELVIC COMPLETE: CPT

## 2025-01-24 PROCEDURE — 81025 URINE PREGNANCY TEST: CPT

## 2025-04-01 ENCOUNTER — ASOB RESULT (OUTPATIENT)
Age: 40
End: 2025-04-01

## 2025-04-01 ENCOUNTER — APPOINTMENT (OUTPATIENT)
Facility: CLINIC | Age: 40
End: 2025-04-01

## 2025-04-01 VITALS — DIASTOLIC BLOOD PRESSURE: 62 MMHG | SYSTOLIC BLOOD PRESSURE: 99 MMHG

## 2025-04-01 DIAGNOSIS — Z01.419 ENCOUNTER FOR GYNECOLOGICAL EXAMINATION (GENERAL) (ROUTINE) W/OUT ABNORMAL FINDINGS: ICD-10-CM

## 2025-04-01 DIAGNOSIS — N92.0 EXCESSIVE AND FREQUENT MENSTRUATION WITH REGULAR CYCLE: ICD-10-CM

## 2025-04-01 LAB — HCG UR QL: NEGATIVE

## 2025-04-01 PROCEDURE — 81025 URINE PREGNANCY TEST: CPT

## 2025-04-01 PROCEDURE — 99459 PELVIC EXAMINATION: CPT

## 2025-04-01 PROCEDURE — G0444 DEPRESSION SCREEN ANNUAL: CPT | Mod: 59

## 2025-04-01 PROCEDURE — 99395 PREV VISIT EST AGE 18-39: CPT

## 2025-04-01 PROCEDURE — 76830 TRANSVAGINAL US NON-OB: CPT

## 2025-04-01 PROCEDURE — 76376 3D RENDER W/INTRP POSTPROCES: CPT

## 2025-04-03 LAB — HPV HIGH+LOW RISK DNA PNL CVX: NOT DETECTED

## 2025-04-08 LAB — CYTOLOGY CVX/VAG DOC THIN PREP: NORMAL

## 2025-05-30 NOTE — PATIENT PROFILE OB - AMNIOTIC FLUID AMOUNT, LABOR
History of present illness:   Shani Adame is a 54 year old female who is here today for   Chief Complaint   Patient presents with    Leg     Bilateral leg burning sensation started x 1.5 wks ago        The patient presents for evaluation of a burning sensation in her legs and weight management.    Burning Sensation in Legs  She reports a persistent burning sensation in her legs much worse on the L side compared to the R, similar to a severe sunburn and sensation of skin irritation, extending from the lateral aspect of the hip to the knee. The discomfort is constant and exacerbated by being touched or physical activity. No numbness or tingling. No skin changes.     Weight Management  On Mounjaro 7.5 mg for 3 months with no side effects but no significant weight loss. Decreased appetite and increased satiety. 2 doses remaining.  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)    Hemangioma of liver    Paresthesia of both hands    Arthritis of left knee    Arthritis of right knee    Status post right knee replacement    Lumbar radiculopathy    Lumbosacral spondylosis without myelopathy    Thoracic spondylosis without myelopathy    Facet hypertrophy of lumbar region    Lumbar degenerative disc disease    Class 3 severe obesity with serious comorbidity and body mass index (BMI) of 45.0 to 49.9 in adult    Primary hypertension    Mixed stress and urge urinary incontinence    Tachycardia    Deviated nasal septum    Nasal obstruction    Hypertrophy of inferior nasal turbinate    Type 2 diabetes mellitus without complication, without long-term current use of insulin (CMD)    Other hyperlipidemia    Chronic left shoulder pain    Chronic foot pain, left     Current Outpatient Medications   Medication Sig Dispense Refill    tirzepatide (Mounjaro) 10 MG/0.5ML  Solution Auto-injector Inject 10 mg into the skin every 7 days. Indications: Type 2 Diabetes 2 mL 0    tirzepatide (Mounjaro) 12.5 MG/0.5ML Solution Auto-injector Indications: Type 2 Diabetes Start this dose after finishing 4 weeks of the 10mg once weekly dose 2 mL 0    DULoxetine (CYMBALTA) 30 MG capsule Take 1 capsule by mouth daily. 30 capsule 1    baclofen (LIORESAL) 10 MG tablet TAKE 1 TABLET BY MOUTH THREE TIMES DAILY(6-8 HOURS APART) AS NEEDED FOR MUSCLE SPASMS OR PAIN 90 tablet 0    gabapentin (NEURONTIN) 600 MG tablet Take 1 tablet by mouth in the morning and 1 tablet at noon and 1 tablet in the evening. 90 tablet 0    meloxicam (MOBIC) 15 MG tablet Take 1 tablet daily for 10 days and then resume taking 1 tablet by mouth daily as needed for pain 30 tablet 2    nortriptyline (PAMELOR) 25 MG capsule TAKE ONE CAPSULE BY MOUTH NIGHTLY 30 capsule 3    terbinafine (LamISIL AT) 1 % cream Apply topically nightly. 30 g 1    estradiol (ESTRACE) 0.1 MG/GM vaginal cream Place 1 g vaginally daily. Also apply some to the affected areas on the external vulva. After 2 weeks of daily usage, transition to twice weekly. 42.5 g 12    empagliflozin (JARDIANCE) 10 MG tablet Take 1 tablet by mouth daily (before breakfast). 90 tablet 3    carvedilol (COREG) 6.25 MG tablet TAKE 1 TABLET BY MOUTH IN THE MORNING AND IN THE EVENING WITH MEALS 180 tablet 3    mirabegron ER (MYRBETRIQ) 50 MG 24 hr tablet Take 1 tablet by mouth daily. 90 tablet 3    rosuvastatin (CRESTOR) 5 MG tablet Take 1 tablet by mouth daily. 90 tablet 3    losartan (COZAAR) 50 MG tablet Take 1 tablet by mouth daily. 90 tablet 3    pantoprazole (PROTONIX) 40 MG tablet Take 1 tablet by mouth in the morning and 1 tablet in the evening. 90 tablet 3    famotidine (PEPCID) 20 MG tablet Take 1 tablet by mouth nightly. 90 tablet 3     No current facility-administered medications for this visit.     Review of systems:   Review of systems is negative besides what is mentioned  in the HPI.     Visit Vitals  /88 (BP Location: LUE - Left upper extremity, Patient Position: Sitting, Cuff Size: Regular)   Pulse 84   Temp 97.8 °F (36.6 °C) (Tympanic)   Ht 5' 4\" (1.626 m)   Wt 121.1 kg (266 lb 13.9 oz)   BMI 45.81 kg/m²      Physical Exam:   General: alert and oriented, not in acute distress   MSK: gait is normal, skin is intact without any rashes or color changes, full ROM with some discomfort (L hip), no tenderness to palpation   Psych: normal mood and affect     Assessment and plan:   Shani Adame is a 54 year old female presenting to clinic today for a chief complaint of   Chief Complaint   Patient presents with    Leg     Bilateral leg burning sensation started x 1.5 wks ago     Details of plan as follows:     Type 2 diabetes mellitus without complication, without long-term current use of insulin  (CMD)  - Type 2 Diabetes, chronic, Controlled  - HBA1C At goal Repeat not needed at this time  - Lipids Above goal , Repeat not needed at this time, currently on: Rosuvastatin 5mg daily   - BP At goal , continue with: Coreg 6.25mg BID, losartan 50mg   - Other complications: Neuropathy: Present, Gastroparesis: Absent, retinopathy/ ophthalmic coplication not previously assessed, eye exam is scheduled   - Co-morbidities: HTN Yes, CKD No, HF No, CAD No  - Prior medication failure/ intolerability: Metformin (even low dose and ER) due to severe GI side effects   - Medication changes: increase Mounjaro to 10mg   - continue current regimen: Jardiance 10mg     Meralgia paresthetica   Discussed the diagnosis with the patient   She is already on Nortrptyline which should improve neuropathic type pain   She had no response with Gabapentin/ and pregabalin in the past   Start Duloxetine 30mg today   She has an apt coming up with Ortho to discuss hip pain     Jaylon Mcnair MD   Family Medicine    within normal limits

## 2025-07-25 ENCOUNTER — APPOINTMENT (OUTPATIENT)
Dept: INTERNAL MEDICINE | Facility: CLINIC | Age: 40
End: 2025-07-25
Payer: COMMERCIAL

## 2025-07-25 VITALS
SYSTOLIC BLOOD PRESSURE: 96 MMHG | TEMPERATURE: 97.8 F | BODY MASS INDEX: 27.49 KG/M2 | HEIGHT: 64 IN | WEIGHT: 161 LBS | OXYGEN SATURATION: 97 % | DIASTOLIC BLOOD PRESSURE: 62 MMHG | HEART RATE: 61 BPM | RESPIRATION RATE: 17 BRPM

## 2025-07-25 DIAGNOSIS — Z00.00 ENCOUNTER FOR GENERAL ADULT MEDICAL EXAMINATION W/OUT ABNORMAL FINDINGS: ICD-10-CM

## 2025-07-25 DIAGNOSIS — R68.82 DECREASED LIBIDO: ICD-10-CM

## 2025-07-25 DIAGNOSIS — R14.0 ABDOMINAL DISTENSION (GASEOUS): ICD-10-CM

## 2025-07-25 DIAGNOSIS — E55.9 VITAMIN D DEFICIENCY, UNSPECIFIED: ICD-10-CM

## 2025-07-25 DIAGNOSIS — R79.89 OTHER SPECIFIED ABNORMAL FINDINGS OF BLOOD CHEMISTRY: ICD-10-CM

## 2025-07-25 DIAGNOSIS — E66.9 OVERWEIGHT: ICD-10-CM

## 2025-07-25 DIAGNOSIS — K30 FUNCTIONAL DYSPEPSIA: ICD-10-CM

## 2025-07-25 DIAGNOSIS — E66.3 OVERWEIGHT: ICD-10-CM

## 2025-07-25 PROCEDURE — G0444 DEPRESSION SCREEN ANNUAL: CPT | Mod: 59

## 2025-07-25 PROCEDURE — G0447 BEHAVIOR COUNSEL OBESITY 15M: CPT | Mod: 59

## 2025-07-25 PROCEDURE — 99213 OFFICE O/P EST LOW 20 MIN: CPT | Mod: 25

## 2025-07-25 PROCEDURE — 99396 PREV VISIT EST AGE 40-64: CPT

## 2025-07-27 LAB
25(OH)D3 SERPL-MCNC: 37.8 NG/ML
ALBUMIN SERPL ELPH-MCNC: 4.3 G/DL
ALBUMIN, RANDOM URINE: <1.2 MG/DL
ALP BLD-CCNC: 60 U/L
ALT SERPL-CCNC: 19 U/L
ANION GAP SERPL CALC-SCNC: 12 MMOL/L
APPEARANCE: CLEAR
AST SERPL-CCNC: 15 U/L
BASOPHILS # BLD AUTO: 0.06 K/UL
BASOPHILS NFR BLD AUTO: 1 %
BILIRUB SERPL-MCNC: 0.3 MG/DL
BILIRUBIN URINE: NEGATIVE
BLOOD URINE: NEGATIVE
BUN SERPL-MCNC: 11 MG/DL
CALCIUM SERPL-MCNC: 9.5 MG/DL
CHLORIDE SERPL-SCNC: 103 MMOL/L
CHOLEST SERPL-MCNC: 190 MG/DL
CO2 SERPL-SCNC: 25 MMOL/L
COLOR: YELLOW
CREAT SERPL-MCNC: 0.56 MG/DL
CREAT SPEC-SCNC: 35 MG/DL
EGFRCR SERPLBLD CKD-EPI 2021: 118 ML/MIN/1.73M2
EOSINOPHIL # BLD AUTO: 0.16 K/UL
EOSINOPHIL NFR BLD AUTO: 2.6 %
ESTIMATED AVERAGE GLUCOSE: 103 MG/DL
GGT SERPL-CCNC: 9 U/L
GLUCOSE QUALITATIVE U: NEGATIVE MG/DL
GLUCOSE SERPL-MCNC: 100 MG/DL
HBA1C MFR BLD HPLC: 5.2 %
HBV SURFACE AB SER QL: REACTIVE
HCT VFR BLD CALC: 40.4 %
HCV AB SER QL: NONREACTIVE
HCV S/CO RATIO: 0.09 S/CO
HDLC SERPL-MCNC: 84 MG/DL
HGB BLD-MCNC: 12.9 G/DL
IMM GRANULOCYTES NFR BLD AUTO: 0.3 %
KETONES URINE: NEGATIVE MG/DL
LDLC SERPL-MCNC: 97 MG/DL
LEUKOCYTE ESTERASE URINE: NEGATIVE
LYMPHOCYTES # BLD AUTO: 2.36 K/UL
LYMPHOCYTES NFR BLD AUTO: 39.1 %
MAN DIFF?: NORMAL
MCHC RBC-ENTMCNC: 29.8 PG
MCHC RBC-ENTMCNC: 31.9 G/DL
MCV RBC AUTO: 93.3 FL
MICROALBUMIN/CREAT 24H UR-RTO: NORMAL MG/G
MONOCYTES # BLD AUTO: 0.58 K/UL
MONOCYTES NFR BLD AUTO: 9.6 %
NEUTROPHILS # BLD AUTO: 2.86 K/UL
NEUTROPHILS NFR BLD AUTO: 47.4 %
NITRITE URINE: NEGATIVE
NONHDLC SERPL-MCNC: 105 MG/DL
PH URINE: 7
PLATELET # BLD AUTO: 218 K/UL
POTASSIUM SERPL-SCNC: 4.5 MMOL/L
PROGEST SERPL-MCNC: 0.3 NG/ML
PROLACTIN SERPL-MCNC: 12.9 NG/ML
PROT SERPL-MCNC: 6.5 G/DL
PROTEIN URINE: NEGATIVE MG/DL
RBC # BLD: 4.33 M/UL
RBC # FLD: 13.4 %
SHBG SERPL-SCNC: 72.6 NMOL/L
SODIUM SERPL-SCNC: 140 MMOL/L
SPECIFIC GRAVITY URINE: 1.01
TRIGL SERPL-MCNC: 40 MG/DL
TSH SERPL-ACNC: 0.77 UIU/ML
UREA BREATH TEST QL: NEGATIVE
UROBILINOGEN URINE: 0.2 MG/DL
VIT B12 SERPL-MCNC: 467 PG/ML
WBC # FLD AUTO: 6.04 K/UL

## 2025-07-29 LAB — ESTROGEN SERPL-MCNC: 299 PG/ML

## 2025-08-03 RX ORDER — TIRZEPATIDE 5 MG/.5ML
5 INJECTION, SOLUTION SUBCUTANEOUS
Qty: 2 | Refills: 1 | Status: ACTIVE | COMMUNITY
Start: 2025-07-31 | End: 1900-01-01

## 2025-09-16 ENCOUNTER — ASOB RESULT (OUTPATIENT)
Age: 40
End: 2025-09-16

## 2025-09-16 ENCOUNTER — APPOINTMENT (OUTPATIENT)
Facility: CLINIC | Age: 40
End: 2025-09-16
Payer: COMMERCIAL

## 2025-09-16 VITALS — DIASTOLIC BLOOD PRESSURE: 70 MMHG | SYSTOLIC BLOOD PRESSURE: 112 MMHG

## 2025-09-16 LAB — HCG UR QL: NEGATIVE

## 2025-09-16 PROCEDURE — 76856 US EXAM PELVIC COMPLETE: CPT

## 2025-09-16 PROCEDURE — 76830 TRANSVAGINAL US NON-OB: CPT

## 2025-09-16 PROCEDURE — 99459 PELVIC EXAMINATION: CPT

## 2025-09-16 PROCEDURE — 81025 URINE PREGNANCY TEST: CPT

## 2025-09-16 PROCEDURE — 76376 3D RENDER W/INTRP POSTPROCES: CPT

## 2025-09-16 PROCEDURE — G2211 COMPLEX E/M VISIT ADD ON: CPT

## 2025-09-16 PROCEDURE — 99214 OFFICE O/P EST MOD 30 MIN: CPT

## 2025-09-16 RX ORDER — TRANEXAMIC ACID 650 MG/1
650 TABLET ORAL 3 TIMES DAILY
Qty: 60 | Refills: 1 | Status: ACTIVE | COMMUNITY
Start: 2025-09-16 | End: 1900-01-01

## 2025-09-19 RX ORDER — NITROFURANTOIN (MONOHYDRATE/MACROCRYSTALS) 25; 75 MG/1; MG/1
100 CAPSULE ORAL
Qty: 10 | Refills: 0 | Status: ACTIVE | COMMUNITY
Start: 2025-09-19 | End: 1900-01-01

## 2025-09-25 LAB — BACTERIA UR CULT: ABNORMAL
